# Patient Record
Sex: FEMALE | Race: WHITE | NOT HISPANIC OR LATINO | Employment: FULL TIME | URBAN - METROPOLITAN AREA
[De-identification: names, ages, dates, MRNs, and addresses within clinical notes are randomized per-mention and may not be internally consistent; named-entity substitution may affect disease eponyms.]

---

## 2017-08-30 ENCOUNTER — ALLSCRIPTS OFFICE VISIT (OUTPATIENT)
Dept: OTHER | Facility: OTHER | Age: 57
End: 2017-08-30

## 2017-08-30 DIAGNOSIS — Z12.39 ENCOUNTER FOR OTHER SCREENING FOR MALIGNANT NEOPLASM OF BREAST: ICD-10-CM

## 2017-10-25 ENCOUNTER — GENERIC CONVERSION - ENCOUNTER (OUTPATIENT)
Dept: OTHER | Facility: OTHER | Age: 57
End: 2017-10-25

## 2017-10-25 DIAGNOSIS — E78.9 DISORDER OF LIPOPROTEIN METABOLISM: ICD-10-CM

## 2017-10-25 DIAGNOSIS — R53.83 OTHER FATIGUE: ICD-10-CM

## 2017-12-01 ENCOUNTER — APPOINTMENT (OUTPATIENT)
Dept: LAB | Facility: CLINIC | Age: 57
End: 2017-12-01
Payer: COMMERCIAL

## 2017-12-01 DIAGNOSIS — E78.9 DISORDER OF LIPOPROTEIN METABOLISM: ICD-10-CM

## 2017-12-01 DIAGNOSIS — R53.83 OTHER FATIGUE: ICD-10-CM

## 2017-12-01 LAB
ALBUMIN SERPL BCP-MCNC: 3.4 G/DL (ref 3.5–5)
ALP SERPL-CCNC: 56 U/L (ref 46–116)
ALT SERPL W P-5'-P-CCNC: 37 U/L (ref 12–78)
ANION GAP SERPL CALCULATED.3IONS-SCNC: 6 MMOL/L (ref 4–13)
AST SERPL W P-5'-P-CCNC: 28 U/L (ref 5–45)
BILIRUB SERPL-MCNC: 0.46 MG/DL (ref 0.2–1)
BUN SERPL-MCNC: 22 MG/DL (ref 5–25)
CALCIUM SERPL-MCNC: 8.8 MG/DL (ref 8.3–10.1)
CHLORIDE SERPL-SCNC: 105 MMOL/L (ref 100–108)
CHOLEST SERPL-MCNC: 193 MG/DL (ref 50–200)
CO2 SERPL-SCNC: 29 MMOL/L (ref 21–32)
CREAT SERPL-MCNC: 0.99 MG/DL (ref 0.6–1.3)
GFR SERPL CREATININE-BSD FRML MDRD: 63 ML/MIN/1.73SQ M
GLUCOSE P FAST SERPL-MCNC: 84 MG/DL (ref 65–99)
HDLC SERPL-MCNC: 84 MG/DL (ref 40–60)
LDLC SERPL CALC-MCNC: 100 MG/DL (ref 0–100)
POTASSIUM SERPL-SCNC: 4.3 MMOL/L (ref 3.5–5.3)
PROT SERPL-MCNC: 7 G/DL (ref 6.4–8.2)
SODIUM SERPL-SCNC: 140 MMOL/L (ref 136–145)
TRIGL SERPL-MCNC: 45 MG/DL

## 2017-12-01 PROCEDURE — 80061 LIPID PANEL: CPT

## 2017-12-01 PROCEDURE — 80053 COMPREHEN METABOLIC PANEL: CPT

## 2017-12-01 PROCEDURE — 36415 COLL VENOUS BLD VENIPUNCTURE: CPT

## 2018-01-13 NOTE — PROGRESS NOTES
Assessment    1  Encounter for preventive health examination (V70 0) (Z00 00)    Plan  Health Maintenance    · (1) OCCULT BLOOD, FECAL IMMUNOCHEMICAL TEST; Status:Active; Requested  for:11Oct2016;     Discussion/Summary  health maintenance visit cervical cancer screening is current Breast cancer screening: mammogram is current  Colorectal cancer screening: FIT test ordered  Osteoporosis screening: bone mineral density testing is current  The immunizations are up to date  The patient's work physical was signed I'll see her back in one urine rashes problem sooner  Possible side effects of new medications were reviewed with the patient/guardian today  Chief Complaint  INSURANCE WELLNESS PE      History of Present Illness  HM, Adult Female: The patient is being seen for a health maintenance and The patient had her last physical one year ago evaluation  General Health: The patient's health since the last visit is described as good  She has regular dental visits  She denies vision problems  She denies hearing loss  Immunizations status: up to date  Lifestyle:  She consumes a diverse and healthy diet  She does not have any weight concerns  She exercises regularly  She does not use tobacco  She denies alcohol use  She denies drug use  Reproductive health:  she reports normal menses  Screening: cancer screening reviewed and current  metabolic screening reviewed and current  risk screening reviewed and current  HPI: The patient presents for an wellness physical  She is up-to-date with her GYN care, mammogram, blood work, and DEXA scan  She is in need of either colonoscopy or a stool screening and she's opted for the latter  Had flu shot yesterday at work  Review of Systems    Constitutional: No fever, no chills, feels well, no tiredness, no recent weight gain or weight loss  Eyes: No complaints of eye pain, no red eyes, no eyesight problems, no discharge, no dry eyes, no itching of eyes  ENT: no complaints of earache, no loss of hearing, no nose bleeds, no nasal discharge, no sore throat, no hoarseness  Cardiovascular: No complaints of slow heart rate, no fast heart rate, no chest pain, no palpitations, no leg claudication, no lower extremity edema  Respiratory: No complaints of shortness of breath, no wheezing, no cough, no SOB on exertion, no orthopnea, no PND  Gastrointestinal: No complaints of abdominal pain, no constipation, no nausea or vomiting, no diarrhea, no bloody stools  Genitourinary: No complaints of dysuria, no incontinence, no pelvic pain, no dysmenorrhea, no vaginal discharge or bleeding  Musculoskeletal: No complaints of arthralgias, no myalgias, no joint swelling or stiffness, no limb pain or swelling  Integumentary: No complaints of skin rash or lesions, no itching, no skin wounds, no breast pain or lump  Neurological: No complaints of headache, no confusion, no convulsions, no numbness, no dizziness or fainting, no tingling, no limb weakness, no difficulty walking  Psychiatric: Not suicidal, no sleep disturbance, no anxiety or depression, no change in personality, no emotional problems  Endocrine: No complaints of proptosis, no hot flashes, no muscle weakness, no deepening of the voice, no feelings of weakness  Hematologic/Lymphatic: No complaints of swollen glands, no swollen glands in the neck, does not bleed easily, does not bruise easily  Active Problems    1  Acute maxillary sinusitis (461 0) (J01 00)   2  Acute upper respiratory infection (465 9) (J06 9)   3  Anxiety (300 00) (F41 9)   4  Encounter for gynecological examination with Papanicolaou smear of cervix   (V72 31,V76 2) (Z01 419,Z12 4)   5  Encounter for screening colonoscopy (V76 51) (Z12 11)   6  Preoperative clearance (V72 84) (Z01 818)   7   Screening for breast cancer (V76 10) (Z12 39)    Past Medical History    · History of dysmenorrhea (V13 29) (Z87 42)   · History of female infertility (V13 29) (Z87 42)   · History of Menopause (627 2) (Z78 0)   · History of Pruritus (698 9) (L29 9)    Surgical History    · History of Biopsy Endometrial, Without Cervical Dilation   · History of Breast Surgery   · History of Cataract Surgery   · History of Laparoscopy (Diagnostic)   · History of Oral Surgery Tooth Extraction   · History of Tonsillectomy    Family History  Mother    · Family history of Diabetes Mellitus (V18 0)   · Family history of cerebrovascular accident (CVA) (V17 1) (Z82 3)   · Family history of osteoporosis (V17 81) (Z82 62)   · Family history of Hypertension (V17 49)  Father    · Family history of Hypertension (V17 49)  Maternal Grandfather    · Family history of diabetes mellitus (V18 0) (Z83 3)  Paternal Grandfather    · Family history of myocardial infarction (V17 3) (Z82 49)  Paternal Aunt    · Family history of malignant neoplasm of breast (V16 3) (Z80 3)    Social History    · Acute alcohol use   · Daily caffeine consumption, 2-3 servings a day   · Denied: History of Drug use   · Exercises 1 to 2 times per week (V49 89) (Z78 9)   · Full-time employment   · Marital History - Currently    ·    · Never A Smoker   · One child    Current Meds   1  ALPRAZolam 0 25 MG Oral Tablet; TAKE 1 TABLET AT BEDTIME; Therapy: 99YVV5051 to (Evaluate:94Qpg1285)  Requested for: 85Duz0938; Last   Rx:27Vcg2226 Ordered   2  Calcium 1200 CHEW; Chew and swallow two tab daily Recorded   3  FLUoxetine HCl - 20 MG Oral Capsule; TAKE 1 CAPSULE DAILY; Therapy: 12ZJK2290 to (Evaluate:76Fxe8743)  Requested for: 07LFS9603; Last   Rx:43Xmm3084 Ordered   4  Multivitamin TABS; Take 1 tablet daily Recorded    Allergies    1  Penicillins   2  Zithromax TABS   3   Zithromax PACK    Vitals   Recorded: 38NOA9244 12:57AY   Systolic 410   Diastolic 72   Heart Rate 56   Temperature 96 3 F   Height 5 ft 5 in   Weight 126 lb    BMI Calculated 20 97   BSA Calculated 1 63     Physical Exam    Constitutional   General appearance: No acute distress, well appearing and well nourished  Head and Face   Head and face: Normal     Palpation of the face and sinuses: No sinus tenderness  Eyes   Conjunctiva and lids: No swelling, erythema or discharge  Pupils and irises: Equal, round, reactive to light  Ophthalmoscopic examination: Normal fundi and optic discs  Ears, Nose, Mouth, and Throat   External inspection of ears and nose: Normal     Otoscopic examination: Tympanic membranes translucent with normal light reflex  Canals patent without erythema  Hearing: Normal     Nasal mucosa, septum, and turbinates: Normal without edema or erythema  Lips, teeth, and gums: Normal, good dentition  Oropharynx: Normal with no erythema, edema, exudate or lesions  Neck   Neck: Supple, symmetric, trachea midline, no masses  Thyroid: Normal, no thyromegaly  Pulmonary   Respiratory effort: No increased work of breathing or signs of respiratory distress  Auscultation of lungs: Clear to auscultation  Cardiovascular   Auscultation of heart: Normal rate and rhythm, normal S1 and S2, no murmurs  Carotid pulses: 2+ bilaterally  Pedal pulses: 2+ bilaterally  Peripheral vascular exam: Normal     Examination of extremities for edema and/or varicosities: Normal     Abdomen   Abdomen: Non-tender, no masses  Liver and spleen: No hepatomegaly or splenomegaly  Lymphatic   Palpation of lymph nodes in neck: No lymphadenopathy  Musculoskeletal   Digits and nails: Normal without clubbing or cyanosis  Skin   Skin and subcutaneous tissue: Normal without rashes or lesions  Palpation of skin and subcutaneous tissue: Normal turgor  Neurologic   Reflexes: 2+ and symmetric  Psychiatric   Judgment and insight: Normal     Orientation to person, place, and time: Normal     Recent and remote memory: Intact      Mood and affect: Normal        Results/Data  PHQ-2 Adult Depression Screening [id number] 03:39PM User, [facility]     Test Name Result Flag Reference   PHQ-2 Adult Depression Score 0     Over the last two weeks, how often have you been bothered by any of the following problems? Little interest or pleasure in doing things: Not at all - 0  Feeling down, depressed, or hopeless: Not at all - 0   PHQ-2 Adult Depression Screening Negative         Health Management  Encounter for screening colonoscopy   COLONOSCOPY; every 10 years; Next Due: 92PXD5004;  Overdue    Signatures   Electronically signed by : Alexander Kiran DO; Oct 15 2016 10:59AM EST                       (Author)

## 2018-01-13 NOTE — RESULT NOTES
Verified Results  (1) THIN PREP PAP WITH IMAGING 13Ctl0986 04:23PM Ila Anderson Order Number: [id number]     Test Name Result Flag Reference   LAB AP CASE REPORT (Report)     Gynecologic Cytology Report            Case: [id number]                  Authorizing Provider: Luna March MD     Collected:      08/23/2016 1623        First Screen:     BLESSING Garcia Received:      08/25/2016 1245        Specimen:  LIQUID-BASED PAP, SCREENING, Cervix   HPV HIGH RISK RESULT (Report)     HPV, High Risk: HPV NEG, HPV16 NEG, HPV18 NEG      Other High Risk HPV Negative, HPV 16 Negative, HPV 18 Negative  HPV types: 16,18,31,33,35,39,45,51,52,56,58,59,66 and 68 DNA are undetectable or below the pre-set threshold  [employer]?s FDA approved Heather 4800 is utilized with strict adherence to the ?s instruction  manual to test for the presence of High-Risk HPV DNA, as well as HPV 16 and HPV 18  This instrument  has been validated by our laboratory and/or by the   A negative result does not preclude the presence of HPV infection because results depend on adequate  specimen collection, absence of inhibitors and sufficient DNA to be detected  Additionally, HPV negative  results are not intended to prevent women from proceeding to colposcopy if clinically warranted  Positive HPV test results indicate the presence of any one or more of the high risk types, but since patients  are often co-infected with low-risk types it does not rule out the presence of low-risk types in patients  with mixed infections  LAB AP GYN PRIMARY INTERPRETATION      Negative for intraepithelial lesion or malignancy  Electronically signed by BLESSING Garcia on 9/1/2016 at 4:45 PM   LAB AP GYN SPECIMEN ADEQUACY      Satisfactory for evaluation  Endocervical/transformation zone component present     LAB AP GYN ADDITIONAL INFORMATION (Report)     [employer]'s FDA approved ,  and ThinPrep Imaging System are   utilized with strict adherence to the 's instruction manual to   prepare gynecologic and non-gynecologic cytology specimens for the   production of ThinPrep slides as well as for gynecologic ThinPrep imaging  These processes have been validated by our laboratory and/or by the     The Pap test is not a diagnostic procedure and should not be used as the   sole means to detect cervical cancer  It is only a screening procedure to   aid in the detection of cervical cancer and its precursors  Both   false-negative and false-positive results have been experienced  Your   patient's test result should be interpreted in this context together with   the history and clinical findings

## 2018-01-14 VITALS
DIASTOLIC BLOOD PRESSURE: 68 MMHG | WEIGHT: 121 LBS | SYSTOLIC BLOOD PRESSURE: 110 MMHG | BODY MASS INDEX: 20.16 KG/M2 | HEIGHT: 65 IN

## 2018-01-22 VITALS
WEIGHT: 121 LBS | TEMPERATURE: 97.1 F | BODY MASS INDEX: 20.16 KG/M2 | HEART RATE: 60 BPM | HEIGHT: 65 IN | DIASTOLIC BLOOD PRESSURE: 72 MMHG | SYSTOLIC BLOOD PRESSURE: 108 MMHG

## 2018-03-06 DIAGNOSIS — F41.9 ANXIETY: ICD-10-CM

## 2018-03-06 DIAGNOSIS — F41.9 ANXIETY: Primary | ICD-10-CM

## 2018-03-06 RX ORDER — ALPRAZOLAM 0.25 MG/1
0.25 TABLET ORAL
Qty: 90 TABLET | Refills: 0 | OUTPATIENT
Start: 2018-03-06 | End: 2018-06-06 | Stop reason: SDUPTHER

## 2018-03-06 RX ORDER — ALPRAZOLAM 0.25 MG/1
0.25 TABLET ORAL
Qty: 90 TABLET | Refills: 0 | OUTPATIENT
Start: 2018-03-06 | End: 2018-03-06 | Stop reason: SDUPTHER

## 2018-03-06 RX ORDER — ALPRAZOLAM 0.25 MG/1
1 TABLET ORAL DAILY
COMMUNITY
Start: 2011-06-21 | End: 2018-03-06 | Stop reason: SDUPTHER

## 2018-03-30 ENCOUNTER — TELEPHONE (OUTPATIENT)
Dept: FAMILY MEDICINE CLINIC | Facility: CLINIC | Age: 58
End: 2018-03-30

## 2018-03-30 DIAGNOSIS — J01.80 ACUTE NON-RECURRENT SINUSITIS OF OTHER SINUS: Primary | ICD-10-CM

## 2018-03-30 RX ORDER — AZITHROMYCIN 250 MG/1
TABLET, FILM COATED ORAL
Qty: 6 TABLET | Refills: 0 | Status: SHIPPED | OUTPATIENT
Start: 2018-03-30 | End: 2018-04-03

## 2018-04-26 LAB — HBA1C MFR BLD HPLC: 5.8 %

## 2018-05-08 ENCOUNTER — OFFICE VISIT (OUTPATIENT)
Dept: FAMILY MEDICINE CLINIC | Facility: CLINIC | Age: 58
End: 2018-05-08
Payer: COMMERCIAL

## 2018-05-08 VITALS
WEIGHT: 123.2 LBS | TEMPERATURE: 97.8 F | DIASTOLIC BLOOD PRESSURE: 70 MMHG | SYSTOLIC BLOOD PRESSURE: 102 MMHG | HEIGHT: 65 IN | BODY MASS INDEX: 20.53 KG/M2 | HEART RATE: 60 BPM

## 2018-05-08 DIAGNOSIS — M25.511 CHRONIC RIGHT SHOULDER PAIN: Primary | ICD-10-CM

## 2018-05-08 DIAGNOSIS — G89.29 CHRONIC RIGHT SHOULDER PAIN: Primary | ICD-10-CM

## 2018-05-08 DIAGNOSIS — D72.819 LEUKOPENIA, UNSPECIFIED TYPE: ICD-10-CM

## 2018-05-08 DIAGNOSIS — E13.9 DIABETES 1.5, MANAGED AS TYPE 2 (HCC): ICD-10-CM

## 2018-05-08 PROCEDURE — 99214 OFFICE O/P EST MOD 30 MIN: CPT | Performed by: FAMILY MEDICINE

## 2018-05-08 RX ORDER — FLUOXETINE HYDROCHLORIDE 20 MG/1
1 CAPSULE ORAL DAILY
COMMUNITY
Start: 2011-02-14 | End: 2018-07-12

## 2018-05-08 RX ORDER — PREDNISONE 10 MG/1
TABLET ORAL
Qty: 26 TABLET | Refills: 0 | Status: SHIPPED | OUTPATIENT
Start: 2018-05-08 | End: 2018-07-12

## 2018-05-08 NOTE — PROGRESS NOTES
Patient ID: Mani Sanabria is a 59 y o  female  HPI: 59 y  o female presenting with chief complaint of continued right shoulder pain, pain with rom and lifting  She denies any weakenss of shoulder  She took a medrol jessica a few mos ago which helped, but totally did not eradicate the pain  Labs drawn for her employer showed HgbA1c of 5 8 and decreased wbc of 2 9  She follows a low concentrated, low carb diet already  SUBJECTIVE    Family History   Problem Relation Age of Onset    Diabetes Mother      Mellitus    Stroke Mother      CVA    Osteoporosis Mother     Hypertension Mother     Hypertension Father     Diabetes Maternal Grandfather      Mellitus    Heart attack Paternal Grandfather      Myocardial Infarction    Breast cancer Paternal Aunt      Social History     Social History    Marital status: /Civil Union     Spouse name: N/A    Number of children: 1    Years of education: N/A     Occupational History    Full-time employment      Social History Main Topics    Smoking status: Never Smoker    Smokeless tobacco: Not on file    Alcohol use Yes      Comment: Acute    Drug use: No    Sexual activity: Not on file     Other Topics Concern    Not on file     Social History Narrative    Daily caffeine consumption: 2-3 servings a day    Exercises 1 to 2 times per week         No past medical history on file  Past Surgical History:   Procedure Laterality Date    BREAST SURGERY Left     Removal of fibroid tumor   Resolved: 1980    CATARACT EXTRACTION      Last assessed: 10/11/16    DIAGNOSTIC LAPAROSCOPY  1982    ENDOMETRIAL BIOPSY  1982    w/o cervical dilation    TONSILLECTOMY      WISDOM TOOTH EXTRACTION      Resolved: 1977     Allergies   Allergen Reactions    Azithromycin Hives     Reaction Date: 03Aug2011;     Penicillins Rash       Current Outpatient Prescriptions:     ALPRAZolam (XANAX) 0 25 mg tablet, Take 1 tablet (0 25 mg total) by mouth daily at bedtime as needed for anxiety, Disp: 90 tablet, Rfl: 0    FLUoxetine (PROzac) 20 mg capsule, Take 1 capsule by mouth daily, Disp: , Rfl:     Multiple Vitamins-Minerals (MULTIVITAMIN ADULTS) TABS, Take 1 tablet by mouth daily, Disp: , Rfl:     predniSONE 10 mg tablet, 3 tabs po bid x2 days, then 2 tabs po bid x2 days, then 1 tab bid x2 days, then 1 daily until done , Disp: 26 tablet, Rfl: 0    Review of Systems  Constitutional:     Denies fever, chills ,fatigue ,weakness ,weight loss, weight gain     ENT: Denies earache ,loss of hearing ,nosebleed, nasal discharge,nasal congestion ,sore throat ,hoarseness  Pulmonary: Denies shortness of breath ,cough  ,dyspnea on exertion, orthopnea  ,PND   Cardiovascular:  Denies bradycardia , tachycardia  ,palpations, lower extremity edema leg, claudication  Breast:  Denies new or changing breast lumps ,nipple discharge ,nipple changes  Abdomen:  Denies abdominal pain , anorexia , indigestion, nausea, vomiting, constipation, diarrhea  Musculoskeletal: Denies myalgias, + right shoulder pain with lifting and rom joint swelling, joint stiffness , limb pain, limb swelling  Gu: denies dysuria, polyuria  Skin: Denies skin rash, skin lesion, skin wound, itching, dry skin  Neuro: Denies headache, numbness, tingling, confusion, loss of consciousness, dizziness, vertigo  Psychiatric: Denies feelings of depression, suicidal ideation, anxiety, sleep disturbances    OBJECTIVE    Constitutional:   NAD, well appearing and well nourished      ENT:   Conjunctiva and lids: no injection, edema, or discharge     Pupils and iris: HUMBERTO bilaterally    External inspection of ears and nose: normal without deformities or discharge  Otoscopic exam: Canals patent without erythema  Nasal mucosa, septum and turbinates: Normal or edema or discharge         Oropharynx:  Moist mucosa, normal tongue and tonsils without lesions   No erythema        Pulmonary:Respiratory effort normal rate and rhythm, no increased work of breathing  Auscultation of lungs:  Clear bilaterally with no adventitious breath sounds       Cardiovascular: regular rate and rhythm, S1 and S2, no murmur, no edema and/or varicosities of LE      Abdomen: Soft and non-distended     Positive bowel sounds      No heptomegaly or splenomegaly      Gu: no suprapubic tenderness or CVA tenderness, no urethral discharge  Lymphatic:  No anterior or posterior cervical lymphadenopathy         Musculoskeletal:  Gait and station: Normal gait      Digits and nails normal without clubbing or cyanosis       Inspection/palpation of joints, bones, and muscles: + tenderenss overlying tendon of triceps on palpation; full rom of right shouler with pain    Skin: Normal skin turgor and no rashes      Neuro:     Normal reflexes       Psych:   alert and oriented to person, place and time     normal mood and affect       Assessment/Plan:Diagnoses and all orders for this visit:    Diabetes 1 5, managed as type 2 ([facility] 75 )  -     HEMOGLOBIN A1C W/ EAG ESTIMATION; Future    Leukopenia, unspecified type  -     CBC and differential; Future    Chronic right shoulder pain  -     XR shoulder 2+ vw right; Future  -     predniSONE 10 mg tablet; 3 tabs po bid x2 days, then 2 tabs po bid x2 days, then 1 tab bid x2 days, then 1 daily until done  Other orders  -     FLUoxetine (PROzac) 20 mg capsule; Take 1 capsule by mouth daily  -     Multiple Vitamins-Minerals (MULTIVITAMIN ADULTS) TABS; Take 1 tablet by mouth daily        Reviewed with patient plan to treat with the above  Will await xray results   She is to increase her physical activity and will recheck hgba1c in 3 mos  She is to wait 2 weeks after finishing steroid before rechecking her wBC      Patient instructed to call in 72 hours if not feeling better or if symptoms worsen

## 2018-05-11 ENCOUNTER — APPOINTMENT (OUTPATIENT)
Dept: RADIOLOGY | Facility: MEDICAL CENTER | Age: 58
End: 2018-05-11
Payer: COMMERCIAL

## 2018-05-11 DIAGNOSIS — M25.511 CHRONIC RIGHT SHOULDER PAIN: ICD-10-CM

## 2018-05-11 DIAGNOSIS — G89.29 CHRONIC RIGHT SHOULDER PAIN: ICD-10-CM

## 2018-05-11 PROCEDURE — 73030 X-RAY EXAM OF SHOULDER: CPT

## 2018-05-18 DIAGNOSIS — M25.511 RIGHT SHOULDER PAIN, UNSPECIFIED CHRONICITY: Primary | ICD-10-CM

## 2018-06-06 DIAGNOSIS — F41.9 ANXIETY: ICD-10-CM

## 2018-06-06 RX ORDER — FLUOXETINE HYDROCHLORIDE 20 MG/1
20 CAPSULE ORAL DAILY
Qty: 90 CAPSULE | Refills: 3 | Status: SHIPPED | OUTPATIENT
Start: 2018-06-06 | End: 2019-09-01 | Stop reason: SDUPTHER

## 2018-06-06 RX ORDER — ALPRAZOLAM 0.25 MG/1
0.25 TABLET ORAL
Qty: 90 TABLET | Refills: 0 | Status: SHIPPED | OUTPATIENT
Start: 2018-06-06 | End: 2018-09-05 | Stop reason: SDUPTHER

## 2018-06-18 ENCOUNTER — HOSPITAL ENCOUNTER (OUTPATIENT)
Dept: MRI IMAGING | Facility: HOSPITAL | Age: 58
Discharge: HOME | End: 2018-06-18
Payer: COMMERCIAL

## 2018-06-18 DIAGNOSIS — M25.511 RIGHT SHOULDER PAIN, UNSPECIFIED CHRONICITY: ICD-10-CM

## 2018-06-18 PROCEDURE — 73221 MRI JOINT UPR EXTREM W/O DYE: CPT

## 2018-06-20 DIAGNOSIS — M77.8 TENDONITIS OF SHOULDER, RIGHT: Primary | ICD-10-CM

## 2018-06-21 ENCOUNTER — APPOINTMENT (OUTPATIENT)
Dept: LAB | Facility: CLINIC | Age: 58
End: 2018-06-21
Payer: COMMERCIAL

## 2018-06-21 DIAGNOSIS — D72.819 LEUKOPENIA, UNSPECIFIED TYPE: ICD-10-CM

## 2018-06-21 LAB
BASOPHILS # BLD AUTO: 0.02 THOUSANDS/ΜL (ref 0–0.1)
BASOPHILS NFR BLD AUTO: 1 % (ref 0–1)
EOSINOPHIL # BLD AUTO: 0.04 THOUSAND/ΜL (ref 0–0.61)
EOSINOPHIL NFR BLD AUTO: 1 % (ref 0–6)
ERYTHROCYTE [DISTWIDTH] IN BLOOD BY AUTOMATED COUNT: 14 % (ref 11.6–15.1)
HCT VFR BLD AUTO: 39.4 % (ref 34.8–46.1)
HGB BLD-MCNC: 12.6 G/DL (ref 11.5–15.4)
IMM GRANULOCYTES # BLD AUTO: 0.02 THOUSAND/UL (ref 0–0.2)
IMM GRANULOCYTES NFR BLD AUTO: 1 % (ref 0–2)
LYMPHOCYTES # BLD AUTO: 1.35 THOUSANDS/ΜL (ref 0.6–4.47)
LYMPHOCYTES NFR BLD AUTO: 31 % (ref 14–44)
MCH RBC QN AUTO: 32.1 PG (ref 26.8–34.3)
MCHC RBC AUTO-ENTMCNC: 32 G/DL (ref 31.4–37.4)
MCV RBC AUTO: 100 FL (ref 82–98)
MONOCYTES # BLD AUTO: 0.25 THOUSAND/ΜL (ref 0.17–1.22)
MONOCYTES NFR BLD AUTO: 6 % (ref 4–12)
NEUTROPHILS # BLD AUTO: 2.73 THOUSANDS/ΜL (ref 1.85–7.62)
NEUTS SEG NFR BLD AUTO: 60 % (ref 43–75)
NRBC BLD AUTO-RTO: 0 /100 WBCS
PLATELET # BLD AUTO: 251 THOUSANDS/UL (ref 149–390)
PMV BLD AUTO: 10.3 FL (ref 8.9–12.7)
RBC # BLD AUTO: 3.93 MILLION/UL (ref 3.81–5.12)
WBC # BLD AUTO: 4.41 THOUSAND/UL (ref 4.31–10.16)

## 2018-06-21 PROCEDURE — 36415 COLL VENOUS BLD VENIPUNCTURE: CPT

## 2018-06-21 PROCEDURE — 85025 COMPLETE CBC W/AUTO DIFF WBC: CPT

## 2018-07-12 ENCOUNTER — OFFICE VISIT (OUTPATIENT)
Dept: OBGYN CLINIC | Facility: MEDICAL CENTER | Age: 58
End: 2018-07-12
Payer: COMMERCIAL

## 2018-07-12 VITALS
SYSTOLIC BLOOD PRESSURE: 115 MMHG | BODY MASS INDEX: 19.83 KG/M2 | DIASTOLIC BLOOD PRESSURE: 72 MMHG | HEART RATE: 54 BPM | WEIGHT: 119 LBS | HEIGHT: 65 IN

## 2018-07-12 DIAGNOSIS — M67.813 BICEPS TENDINOSIS OF RIGHT SHOULDER: ICD-10-CM

## 2018-07-12 DIAGNOSIS — M25.511 RIGHT SHOULDER PAIN, UNSPECIFIED CHRONICITY: Primary | ICD-10-CM

## 2018-07-12 DIAGNOSIS — M77.8 TENDONITIS OF SHOULDER, RIGHT: ICD-10-CM

## 2018-07-12 DIAGNOSIS — M75.51 SUBACROMIAL BURSITIS OF RIGHT SHOULDER JOINT: ICD-10-CM

## 2018-07-12 PROCEDURE — 20610 DRAIN/INJ JOINT/BURSA W/O US: CPT | Performed by: FAMILY MEDICINE

## 2018-07-12 PROCEDURE — 99204 OFFICE O/P NEW MOD 45 MIN: CPT | Performed by: FAMILY MEDICINE

## 2018-07-12 RX ORDER — LIDOCAINE HYDROCHLORIDE 10 MG/ML
4 INJECTION, SOLUTION INFILTRATION; PERINEURAL
Status: COMPLETED | OUTPATIENT
Start: 2018-07-12 | End: 2018-07-12

## 2018-07-12 RX ORDER — TRIAMCINOLONE ACETONIDE 40 MG/ML
40 INJECTION, SUSPENSION INTRA-ARTICULAR; INTRAMUSCULAR
Status: COMPLETED | OUTPATIENT
Start: 2018-07-12 | End: 2018-07-12

## 2018-07-12 RX ADMIN — TRIAMCINOLONE ACETONIDE 40 MG: 40 INJECTION, SUSPENSION INTRA-ARTICULAR; INTRAMUSCULAR at 15:28

## 2018-07-12 RX ADMIN — LIDOCAINE HYDROCHLORIDE 4 ML: 10 INJECTION, SOLUTION INFILTRATION; PERINEURAL at 15:28

## 2018-07-12 NOTE — PROGRESS NOTES
Assessment:     1  Right shoulder pain, unspecified chronicity    2  Tendonitis of shoulder, right    3  Subacromial bursitis of right shoulder joint    4  Biceps tendinosis of right shoulder        Plan:     Problem List Items Addressed This Visit     Subacromial bursitis of right shoulder joint    Relevant Orders    Ambulatory referral to Physical Therapy    Tendonitis of shoulder, right     Patient with right shoulder pain consistent with tendinosis of the rotator cuff muscles and biceps tendon  MRI results have been reviewed with the patient  Today she has been provided with intra-articular cortisone injection which she tolerated well  Going forward, we will recommend physical therapy for additional shoulder strengthening and range of motion  Follow-up in the future as needed for any further concerns or questions  Relevant Orders    Ambulatory referral to Physical Therapy    Biceps tendinosis of right shoulder      Other Visit Diagnoses     Right shoulder pain, unspecified chronicity    -  Primary    Relevant Orders    Ambulatory referral to Physical Therapy         Subjective:     Patient ID: Javi Kaufman is a 59 y o  female  Chief Complaint:  Patient is a 75-year-old female presenting today for evaluation of right shoulder pain  She reports having pain for the past few months located over the anterior lateral aspect of the shoulder  Pain is described as a throbbing, achy pain that radiates down his right arm to the elbow  She also reports having difficulty with getting dressed and reaching behind her back or above her head with putting on her clothes  She does not report any injuries or trauma to the shoulder  She has completed 2 courses of prednisone therapy in addition to anti-inflammatories provided to her by her PCP which provided minimal relief  She denies any numbness or tingling  She denies any crepitus, warmth or swelling        Allergy:  Allergies   Allergen Reactions    Azithromycin Hives     Reaction Date: 03Aug2011;     Penicillins Rash     Medications:  all current active meds have been reviewed  Past Medical History:  Past Medical History:   Diagnosis Date    Fractures      Past Surgical History:  Past Surgical History:   Procedure Laterality Date    BREAST SURGERY Left     Removal of fibroid tumor  Resolved: 1980    CATARACT EXTRACTION      Last assessed: 10/11/16    DIAGNOSTIC LAPAROSCOPY  1982    ENDOMETRIAL BIOPSY  1982    w/o cervical dilation    TONSILLECTOMY      WISDOM TOOTH EXTRACTION      Resolved: 1977     Family History:  Family History   Problem Relation Age of Onset    Diabetes Mother         Mellitus    Stroke Mother         CVA    Osteoporosis Mother     Hypertension Mother     Hypertension Father     Diabetes Maternal Grandfather         Mellitus    Heart attack Paternal Grandfather         Myocardial Infarction    Breast cancer Paternal Aunt      Social History:  History   Alcohol Use    Yes     Comment: Acute     History   Drug Use No     History   Smoking Status    Never Smoker   Smokeless Tobacco    Never Used     Review of Systems   Constitutional: Negative  HENT: Negative  Eyes: Negative  Respiratory: Negative  Cardiovascular: Negative  Gastrointestinal: Negative  Genitourinary: Negative  Musculoskeletal: Positive for arthralgias and myalgias  Skin: Negative  Allergic/Immunologic: Negative  Neurological: Negative  Hematological: Negative  Psychiatric/Behavioral: Negative  Objective:  BP Readings from Last 1 Encounters:   07/12/18 115/72      Wt Readings from Last 1 Encounters:   07/12/18 54 kg (119 lb)      BMI:   Estimated body mass index is 19 8 kg/m² as calculated from the following:    Height as of this encounter: 5' 5" (1 651 m)  Weight as of this encounter: 54 kg (119 lb)    BSA:   Estimated body surface area is 1 59 meters squared as calculated from the following:    Height as of this encounter: 5' 5" (1 651 m)  Weight as of this encounter: 54 kg (119 lb)  Physical Exam   Constitutional: She is oriented to person, place, and time  Vital signs are normal  She appears well-developed  HENT:   Head: Normocephalic  Eyes: Pupils are equal, round, and reactive to light  Pulmonary/Chest: Effort normal    Musculoskeletal: She exhibits tenderness  Neurological: She is alert and oriented to person, place, and time  Skin: Skin is warm and dry  Psychiatric: She has a normal mood and affect  Nursing note and vitals reviewed  Right Shoulder Exam     Tenderness   The patient is experiencing tenderness in the acromioclavicular joint, biceps tendon and acromion  Range of Motion   Active Abduction: abnormal   Passive Abduction: abnormal   Extension: abnormal   Forward Flexion: abnormal   External Rotation: abnormal   Internal Rotation 0 degrees: abnormal   Internal Rotation 90 degrees: abnormal     Muscle Strength   Abduction: 4/5   Internal Rotation: 4/5   External Rotation: 4/5   Supraspinatus: 4/5   Subscapularis: 4/5   Biceps: 4/5     Tests   Apprehension: positive  Hawkin's test: positive  Impingement: positive    Other   Erythema: absent  Sensation: normal  Pulse: present      Left Shoulder Exam   Left shoulder exam is normal     Tenderness   The patient is experiencing no tenderness  Range of Motion   The patient has normal left shoulder ROM            Large joint arthrocentesis  Date/Time: 7/12/2018 3:28 PM  Consent given by: patient  Site marked: site marked  Supporting Documentation  Indications: pain and joint swelling   Procedure Details  Location: shoulder - R glenohumeral  Preparation: Patient was prepped and draped in the usual sterile fashion  Needle size: 22 G  Ultrasound guidance: no  Approach: posterior  Medications administered: 4 mL lidocaine 1 %; 40 mg triamcinolone acetonide 40 mg/mL    Patient tolerance: patient tolerated the procedure well with no immediate complications  Dressing:  Sterile dressing applied      I have personally reviewed pertinent films in PACS  MRI findings demonstrate Supraspinatus infraspinatus insertional tendinosis with no evidence of full-thickness tear of the rotator cuff  There is moderate biceps tendinosis with no tear  Small subacromial bursitis  Mild AC joint degenerative changes

## 2018-07-12 NOTE — ASSESSMENT & PLAN NOTE
Patient with right shoulder pain consistent with tendinosis of the rotator cuff muscles and biceps tendon  MRI results have been reviewed with the patient  Today she has been provided with intra-articular cortisone injection which she tolerated well  Going forward, we will recommend physical therapy for additional shoulder strengthening and range of motion  Follow-up in the future as needed for any further concerns or questions

## 2018-07-31 ENCOUNTER — EVALUATION (OUTPATIENT)
Dept: PHYSICAL THERAPY | Facility: CLINIC | Age: 58
End: 2018-07-31
Payer: COMMERCIAL

## 2018-07-31 DIAGNOSIS — M77.8 TENDONITIS OF SHOULDER, RIGHT: ICD-10-CM

## 2018-07-31 DIAGNOSIS — M25.511 RIGHT SHOULDER PAIN, UNSPECIFIED CHRONICITY: Primary | ICD-10-CM

## 2018-07-31 DIAGNOSIS — M67.813 BICEPS TENDINOSIS OF RIGHT SHOULDER: ICD-10-CM

## 2018-07-31 DIAGNOSIS — M75.51 SUBACROMIAL BURSITIS OF RIGHT SHOULDER JOINT: ICD-10-CM

## 2018-07-31 PROCEDURE — 97110 THERAPEUTIC EXERCISES: CPT | Performed by: PHYSICAL THERAPIST

## 2018-07-31 PROCEDURE — G8991 OTHER PT/OT GOAL STATUS: HCPCS | Performed by: PHYSICAL THERAPIST

## 2018-07-31 PROCEDURE — 97161 PT EVAL LOW COMPLEX 20 MIN: CPT | Performed by: PHYSICAL THERAPIST

## 2018-07-31 PROCEDURE — G8990 OTHER PT/OT CURRENT STATUS: HCPCS | Performed by: PHYSICAL THERAPIST

## 2018-07-31 NOTE — PROGRESS NOTES
PT Evaluation     Today's date: 2018  Patient name: Andreia Light  : 1960  MRN: [id number]  Referring provider: Aurelio Hinson DO  Dx:   Encounter Diagnosis     ICD-10-CM    1  Right shoulder pain, unspecified chronicity M25 511 Ambulatory referral to Physical Therapy   2  Tendonitis of shoulder, right M75 81 Ambulatory referral to Physical Therapy   3  Subacromial bursitis of right shoulder joint M75 51 Ambulatory referral to Physical Therapy                  Assessment  Impairments: abnormal or restricted ROM, impaired physical strength, lacks appropriate home exercise program and pain with function    Assessment details: Patient presents with pain and decreased function in the right shoulder with tendinosis of the rotator cuff and proximal biceps as well as subacromial bursitis  Understanding of Dx/Px/POC: good   Prognosis: good    Goals  STG Patient is independent with HEP   STG Increase strength by half grade in 2 weeks  STG Range of motion is improved by 25% in 4 weeks  LTG Flexibility is improved in 4 weeks  LTG ADL performance improved to prior level of function      Plan  Patient would benefit from: skilled physical therapy  Planned therapy interventions: manual therapy, joint mobilization, strengthening, therapeutic activities, therapeutic exercise, stretching, graded activity, home exercise program and functional ROM exercises  Frequency: 2x week  Duration in visits: 12  Duration in weeks: 6  Treatment plan discussed with: patient        Subjective Evaluation    History of Present Illness  Mechanism of injury: Patient reports onset right shoulder pain starting in the fall of   Pain increased and decreased over time and eventually she had an MRI which revealed supraspinatus/infraspinatus and proximal biceps tendinosis with SA bursitis  She had an injection on  which reduced symptoms after a week      Pain  Current pain ratin  At worst pain ratin  Quality: dull ache  Relieving factors: rest    Patient Goals  Patient goals for therapy: increased strength, independence with ADLs/IADLs, decreased pain and increased motion          Objective     Active Range of Motion   Left Shoulder   Flexion: 170 degrees   Extension: 75 degrees   Abduction: 175 degrees     Right Shoulder   Flexion: 130 degrees   Extension: 40 degrees   Abduction: 135 degrees     Additional Active Range of Motion Details  Reach up low back (C7 to thumb)  7cm left, 22cm right      Passive Range of Motion     Right Shoulder   Flexion: 135 degrees   Abduction: 105 degrees   External rotation 90°: 90 degrees   Internal rotation 45°: 35 degrees     Strength/Myotome Testing     Right Shoulder     Planes of Motion   External rotation at 0°: 4   Internal rotation at 0°: 4     General Comments     Shoulder Comments   Posture:  Left lateral trunk shift          Precautions: none     Daily Treatment Diary     Manual  7/31            IR stretching 5'            Inferior glides                                                        Exercise Diary  7/31            Sleeper stretch IR :20x3            Table slides flexion :05x10            pendulums             No pulley's yet             pball rolls on table             RTC isometrics             TB rows y 21            TB ext y20            TB ER y20            TB IR             TB w's when able             [facility] ext                                                                                                                         Modalities

## 2018-08-02 ENCOUNTER — OFFICE VISIT (OUTPATIENT)
Dept: PHYSICAL THERAPY | Facility: CLINIC | Age: 58
End: 2018-08-02
Payer: COMMERCIAL

## 2018-08-02 DIAGNOSIS — M25.511 RIGHT SHOULDER PAIN, UNSPECIFIED CHRONICITY: Primary | ICD-10-CM

## 2018-08-02 DIAGNOSIS — M77.8 TENDONITIS OF SHOULDER, RIGHT: ICD-10-CM

## 2018-08-02 DIAGNOSIS — M75.51 SUBACROMIAL BURSITIS OF RIGHT SHOULDER JOINT: ICD-10-CM

## 2018-08-02 DIAGNOSIS — M67.813 BICEPS TENDINOSIS OF RIGHT SHOULDER: ICD-10-CM

## 2018-08-02 PROCEDURE — 97110 THERAPEUTIC EXERCISES: CPT | Performed by: PHYSICAL THERAPIST

## 2018-08-02 PROCEDURE — G8991 OTHER PT/OT GOAL STATUS: HCPCS | Performed by: PHYSICAL THERAPIST

## 2018-08-02 PROCEDURE — 97140 MANUAL THERAPY 1/> REGIONS: CPT | Performed by: PHYSICAL THERAPIST

## 2018-08-02 PROCEDURE — G8990 OTHER PT/OT CURRENT STATUS: HCPCS | Performed by: PHYSICAL THERAPIST

## 2018-08-02 NOTE — PROGRESS NOTES
Daily Note     Today's date: 2018  Patient name: Corbin Sierra  : 1960  MRN: [id number]  Referring provider: Asaf Angel DO  Dx:   Encounter Diagnosis     ICD-10-CM    1  Right shoulder pain, unspecified chronicity M25 511    2  Tendonitis of shoulder, right M75 81    3  Subacromial bursitis of right shoulder joint M75 51    4  Biceps tendinosis of right shoulder M75 21                   Subjective: she has been consistent with home exercises; mild soreness after last treatment  No change overall      Objective: See treatment diary below  Precautions: none         Daily Treatment Diary      Manual                     IR stretching 5'  10'                   Inferior glides    p!                                                                                                 Exercise Diary                     Sleeper stretch IR :20x3  :20x3                   Table slides flexion :05x10                     pendulums                       No pulley's yet                       pball rolls on table                       RTC isometrics    :05x20 ER, ext                   TB rows y 20  red :10x10                   TB ext y20  red :10x10                   TB ER y20  red :10x10 partial                   TB IR    red :10x10                   TB w's when able                       Cane ext    #2 :10x10                                                                                                                                                                                                                         Modalities                                                                                                       Assessment: Tolerated treatment well  Patient exhibited good technique with therapeutic exercises and would benefit from continued PT  Mild fatigue after treatment with muscle soreness but no pain  Patient will continue with stretching at home    No strengthening exercises added to allow for adequate recovery time between treatments  Plan: Progress treatment as tolerated

## 2018-08-07 ENCOUNTER — OFFICE VISIT (OUTPATIENT)
Dept: PHYSICAL THERAPY | Facility: CLINIC | Age: 58
End: 2018-08-07
Payer: COMMERCIAL

## 2018-08-07 DIAGNOSIS — M67.813 BICEPS TENDINOSIS OF RIGHT SHOULDER: ICD-10-CM

## 2018-08-07 DIAGNOSIS — M25.511 RIGHT SHOULDER PAIN, UNSPECIFIED CHRONICITY: Primary | ICD-10-CM

## 2018-08-07 DIAGNOSIS — M77.8 TENDONITIS OF SHOULDER, RIGHT: ICD-10-CM

## 2018-08-07 DIAGNOSIS — M75.51 SUBACROMIAL BURSITIS OF RIGHT SHOULDER JOINT: ICD-10-CM

## 2018-08-07 PROCEDURE — 97140 MANUAL THERAPY 1/> REGIONS: CPT

## 2018-08-07 PROCEDURE — 97110 THERAPEUTIC EXERCISES: CPT

## 2018-08-07 NOTE — PROGRESS NOTES
Daily Note     Today's date: 2018  Patient name: Gloria Estrada  : 1960  MRN: [id number]  Referring provider: Quyen Mcqueen DO  Dx:   Encounter Diagnosis     ICD-10-CM    1  Right shoulder pain, unspecified chronicity M25 511    2  Tendonitis of shoulder, right M75 81    3  Subacromial bursitis of right shoulder joint M75 51    4  Biceps tendinosis of right shoulder M75 21                   Subjective: Patient reports soreness following last session which resolved in 24 hours, no change in shoulder pain overall        Objective: See treatment diary below  Precautions: none         Daily Treatment Diary      Manual                   IR stretching 5'  10'  10'                 Inferior glides    p!                                                                                                 Exercise Diary                   Sleeper stretch IR :20x3  :20x3  :20x4                 Table slides flexion :05x10                     pendulums      20                 No pulley's yet                       pball rolls on table     :05 10x                 RTC isometrics    :05x20 ER, ext :05x20 ER, ext, IR                 TB rows y 20  red :10x10  red :10x10                 TB ext y20  red :10x10  red :10x10                 TB ER y20  red :10x10 partial  red :10x10                 TB IR    red :10x10  red :10x10                 TB w's when able                       Cane ext    #2 :10x10  2# :10x10                                                                                                                                                                                                                       Modalities                                                                                                   Assessment: Tolerated treatment well  Patient had increased discomfort initially with IR stretching  Focused on avoiding pain with exercises performed this session  Most challenged by TB ER  Patient exhibited good technique with therapeutic exercises and would benefit from continued PT      Plan: Progress treatment as tolerated

## 2018-08-09 ENCOUNTER — OFFICE VISIT (OUTPATIENT)
Dept: PHYSICAL THERAPY | Facility: CLINIC | Age: 58
End: 2018-08-09
Payer: COMMERCIAL

## 2018-08-09 DIAGNOSIS — M25.511 RIGHT SHOULDER PAIN, UNSPECIFIED CHRONICITY: Primary | ICD-10-CM

## 2018-08-09 DIAGNOSIS — M77.8 TENDONITIS OF SHOULDER, RIGHT: ICD-10-CM

## 2018-08-09 DIAGNOSIS — M75.51 SUBACROMIAL BURSITIS OF RIGHT SHOULDER JOINT: ICD-10-CM

## 2018-08-09 DIAGNOSIS — M67.813 BICEPS TENDINOSIS OF RIGHT SHOULDER: ICD-10-CM

## 2018-08-09 PROCEDURE — 97140 MANUAL THERAPY 1/> REGIONS: CPT

## 2018-08-09 PROCEDURE — 97110 THERAPEUTIC EXERCISES: CPT

## 2018-08-09 NOTE — PROGRESS NOTES
Daily Note     Today's date: 2018  Patient name: Van Cockayne  : 1960  MRN: [id number]  Referring provider: Misael Cardoso DO  Dx:   Encounter Diagnosis     ICD-10-CM    1  Right shoulder pain, unspecified chronicity M25 511    2  Tendonitis of shoulder, right M75 81    3  Subacromial bursitis of right shoulder joint M75 51    4  Biceps tendinosis of right shoulder M75 21                   Subjective: Patient reports general muscular soreness following last session, mild decrease in shoulder pain over the last two days  Objective: Continued with TE progression as indicated    See treatment diary below  Precautions: none         Daily Treatment Diary      Manual                 IR stretching 5'  10'  10'  8'               Inferior glides    p!                                                                                                 Exercise Diary                 Sleeper stretch IR :20x3  :20x3  :20x4 :20x4               Table slides flexion :05x10                     pendulums      20                 No pulley's yet                       pball rolls on table     :05 10x :05 15x                RTC isometrics   :05x20 ER, ext :05 x20 ER, ext, IR :05 x20 ER, ext, IR               TB rows y 20  red :10x10  red :10x10  red :10 x15               TB ext y20  red :10x10  red :10x10  red :10 x15               TB ER y20  red :10x10 partial  red :10x10  red :10 x15               TB IR    red :10x10  red :10x10  red :10 x15               TB w's when able                       Cane ext    #2 :10x10  2# :10x10  2# :10 x10                                                                                                                                                                                                                     Modalities                                                                                                   Assessment: Tolerated treatment well  Better tolerance to IR stretching today  Patient notes fatigue with isometrics (performed last today) though denies pain throughout session  Patient demonstrated fatigue post treatment and would benefit from continued PT      Plan: Progress treatment as tolerated

## 2018-08-14 ENCOUNTER — OFFICE VISIT (OUTPATIENT)
Dept: PHYSICAL THERAPY | Facility: CLINIC | Age: 58
End: 2018-08-14
Payer: COMMERCIAL

## 2018-08-14 DIAGNOSIS — M67.813 BICEPS TENDINOSIS OF RIGHT SHOULDER: ICD-10-CM

## 2018-08-14 DIAGNOSIS — M25.511 RIGHT SHOULDER PAIN, UNSPECIFIED CHRONICITY: Primary | ICD-10-CM

## 2018-08-14 DIAGNOSIS — M75.51 SUBACROMIAL BURSITIS OF RIGHT SHOULDER JOINT: ICD-10-CM

## 2018-08-14 DIAGNOSIS — M77.8 TENDONITIS OF SHOULDER, RIGHT: ICD-10-CM

## 2018-08-14 PROCEDURE — 97140 MANUAL THERAPY 1/> REGIONS: CPT

## 2018-08-14 PROCEDURE — 97110 THERAPEUTIC EXERCISES: CPT

## 2018-08-14 PROCEDURE — 97112 NEUROMUSCULAR REEDUCATION: CPT

## 2018-08-14 NOTE — PROGRESS NOTES
Daily Note     Today's date: 2018  Patient name: Gunjan Avelar  : 1960  MRN: [id number]  Referring provider: Amando Quinones DO  Dx:   Encounter Diagnosis     ICD-10-CM    1  Right shoulder pain, unspecified chronicity M25 511    2  Tendonitis of shoulder, right M75 81    3  Subacromial bursitis of right shoulder joint M75 51    4  Biceps tendinosis of right shoulder M75 21                   Subjective: Patient reports her shoulder continues to feel better though she still has pain with overhead activities and quick reaching into abduction      Objective:  Initiated UBE retro and continued with exercise progression as indicated    See treatment diary below  Precautions: none         Daily Treatment Diary      Manual               IR stretching 5'  10'  10'  8'  8'             Inferior glides    p!                                                                                                 Exercise Diary               Sleeper stretch IR :20x3  :20x3  :20x4 :20x4 :20x4             Table slides flexion :05x10                     pendulums      20                 No pulley's yet                       pball rolls on table     :05 10x :05 15x   :05 20x             RTC isometrics   :05x20 ER, ext :05 x20 ER, ext, IR :05 x20 ER, ext, IR :05 20x ER, ext, IR, flex              SL ER      30x         prone row      30x        TB rows y 20  red :10x10  red :10x10  red :10 x15  red 3x10             TB ext y20  red :10x10  red :10x10  red :10 x15  red 3x10             TB ER bilat y20  red :10x10 partial  red :10x10  red :10 x15  red 3x10             TB IR    red :10x10  red :10x10  red :10 x15  red 3x10             TB w's when able         yellow 2x10             Cane ext    #2 :10x10  2# :10x10  2# :10 x10  2# :10 10x             UBE retro           5'                                                                                                                                                                                         Modalities                                                                                                    Assessment: Tolerated treatment well  Continued tightness into PROM IR with noted improvement post manual stretching  No increased pain with exercises initiated today though patient notes fatigue at conclusion of session  Notes IR sleeper stretch is most uncomfortable exercise  Patient exhibited good technique with therapeutic exercises and would benefit from continued PT      Plan: Progress treatment as tolerated

## 2018-08-16 ENCOUNTER — OFFICE VISIT (OUTPATIENT)
Dept: PHYSICAL THERAPY | Facility: CLINIC | Age: 58
End: 2018-08-16
Payer: COMMERCIAL

## 2018-08-16 DIAGNOSIS — M75.51 SUBACROMIAL BURSITIS OF RIGHT SHOULDER JOINT: ICD-10-CM

## 2018-08-16 DIAGNOSIS — M67.813 BICEPS TENDINOSIS OF RIGHT SHOULDER: ICD-10-CM

## 2018-08-16 DIAGNOSIS — M25.511 RIGHT SHOULDER PAIN, UNSPECIFIED CHRONICITY: Primary | ICD-10-CM

## 2018-08-16 DIAGNOSIS — M77.8 TENDONITIS OF SHOULDER, RIGHT: ICD-10-CM

## 2018-08-16 PROCEDURE — 97140 MANUAL THERAPY 1/> REGIONS: CPT

## 2018-08-16 PROCEDURE — 97110 THERAPEUTIC EXERCISES: CPT

## 2018-08-16 PROCEDURE — 97112 NEUROMUSCULAR REEDUCATION: CPT

## 2018-08-16 NOTE — PROGRESS NOTES
Daily Note     Today's date: 2018  Patient name: Javi Kaufman  : 1960  MRN: [id number]  Referring provider: Viviane Torres DO  Dx:   Encounter Diagnosis     ICD-10-CM    1  Right shoulder pain, unspecified chronicity M25 511    2  Tendonitis of shoulder, right M75 81    3  Subacromial bursitis of right shoulder joint M75 51    4  Biceps tendinosis of right shoulder M75 21                   Subjective: Patient reports fatigue following last session along with mild muscular soreness  She has not had significant pain over the last two days though she has not attempted overhead activities      Objective:  Continued with TE progression focusing on maintaining pain free ROM    See treatment diary below  Precautions: none         Daily Treatment Diary      Manual             IR stretching 5'  10'  10'  8'  8'  8'           Inferior glides    p!                                                                                                 Exercise Diary             Sleeper stretch IR :20x3  :20x3  :20x4 :20x4 :20x4 :20x4           Table slides flexion :05x10                     pendulums      20                 No pulley's yet                       pball rolls on table     :05 10x :05 15x   :05 20x  :05 20x           RTC isometrics   :05x20 ER, ext :05 x20 ER, ext, IR :05 x20 ER, ext, IR :05 20x ER, ext, IR, flex  :05 20x ER, ext, IR, flex            SL ER          30x  30x            prone row          30x  30x           TB rows y 20  red :10x10  red :10x10  red :10 x15  red 3x10  red 3x10           TB ext y20  red :10x10  red :10x10  red :10 x15  red 3x10  red 3x10           TB ER bilat y20  red :10x10 partial  red :10x10  red :10 x15  red 3x10  red 3x10           TB IR    red :10x10  red :10x10  red :10 x15  red 3x10  red 3x10           TB w's when able         yellow 2x10 yellow 3x10           Cane ext    #2 :10x10  2# :10x10  2# :10 x10  2# :10 10x  3# :10 10x           UBE retro           5'  5'                                                                                                                                                                                         Modalities                                                                                                     Assessment: Tolerated treatment well  Increased discomfort initially with PROM IR, subsided after ~5 repetitions  Patient denies increased shoulder pain with exercises performed today  Most challenged by ER strengthening exercises  Patient demonstrated fatigue post treatment and would benefit from continued PT      Plan: Progress treatment as tolerated

## 2018-08-21 ENCOUNTER — OFFICE VISIT (OUTPATIENT)
Dept: PHYSICAL THERAPY | Facility: CLINIC | Age: 58
End: 2018-08-21
Payer: COMMERCIAL

## 2018-08-21 DIAGNOSIS — M25.511 RIGHT SHOULDER PAIN, UNSPECIFIED CHRONICITY: Primary | ICD-10-CM

## 2018-08-21 DIAGNOSIS — M67.813 BICEPS TENDINOSIS OF RIGHT SHOULDER: ICD-10-CM

## 2018-08-21 DIAGNOSIS — M75.51 SUBACROMIAL BURSITIS OF RIGHT SHOULDER JOINT: ICD-10-CM

## 2018-08-21 DIAGNOSIS — M77.8 TENDONITIS OF SHOULDER, RIGHT: ICD-10-CM

## 2018-08-21 PROCEDURE — 97110 THERAPEUTIC EXERCISES: CPT

## 2018-08-21 PROCEDURE — 97140 MANUAL THERAPY 1/> REGIONS: CPT

## 2018-08-21 NOTE — PROGRESS NOTES
Daily Note     Today's date: 2018  Patient name: Roger Dumont  : 1960  MRN: [id number]  Referring provider: Brandi Ellison DO  Dx:   Encounter Diagnosis     ICD-10-CM    1  Right shoulder pain, unspecified chronicity M25 511    2  Tendonitis of shoulder, right M75 81    3  Subacromial bursitis of right shoulder joint M75 51    4  Biceps tendinosis of right shoulder M75 21                   Subjective: Patient reports having minimal soreness 1/10 today  Notes she reached towards the backseat of her car and felt a shooting pain in her shoulder  Notes it went away after awhile, just is sore today      Objective:  Continued with TE progression focusing on maintaining pain free ROM    See treatment diary below  Precautions: none         Daily Treatment Diary      Manual           IR stretching 5'  10'  10'  8'  8'  8'  8'         Inferior glides    p!                                                                                                 Exercise Diary           Sleeper stretch IR :20x3  :20x3  :20x4 :20x4 :20x4 :20x4  :20x4         Table slides flexion :05x10                     pendulums      20                 No pulley's yet                       pball rolls on table     :05 10x :05 15x   :05 20x  :05 20x  :05 20x         RTC isometrics   :05x20 ER, ext :05 x20 ER, ext, IR :05 x20 ER, ext, IR :05 20x ER, ext, IR, flex  :05 20x ER, ext, IR, flex  :05 20x ER, ext, IR, flex          SL ER          30x  30x  30x          prone row          30x  30x  30x         TB rows y 20  red :10x10  red :10x10  red :10 x15  red 3x10  red 3x10  red 3x10         TB ext y20  red :10x10  red :10x10  red :10 x15  red 3x10  red 3x10  red 3x10         TB ER bilat y20  red :10x10 partial  red :10x10  red :10 x15  red 3x10  red 3x10  red 3x10          TB IR    red :10x10  red :10x10  red :10 x15  red 3x10  red 3x10  red 3x10         TB w's when able         yellow 2x10 yellow 3x10  yellow 3x10         Cane ext    #2 :10x10  2# :10x10  2# :10 x10  2# :10 10x  3# :10 10x  3# :10 10x         UBE retro           5'  5'  5'                                                                                                                                                                                       Modalities                                                                                                     Assessment: Tolerated treatment well still being limited in IR but did increase post manuals  Pt had no increased symptoms throughout session  Patient demonstrated fatigue post treatment and would benefit from continued PT      Plan: Progress treatment as tolerated

## 2018-08-23 ENCOUNTER — OFFICE VISIT (OUTPATIENT)
Dept: PHYSICAL THERAPY | Facility: CLINIC | Age: 58
End: 2018-08-23
Payer: COMMERCIAL

## 2018-08-23 DIAGNOSIS — M77.8 TENDONITIS OF SHOULDER, RIGHT: ICD-10-CM

## 2018-08-23 DIAGNOSIS — M67.813 BICEPS TENDINOSIS OF RIGHT SHOULDER: ICD-10-CM

## 2018-08-23 DIAGNOSIS — M75.51 SUBACROMIAL BURSITIS OF RIGHT SHOULDER JOINT: ICD-10-CM

## 2018-08-23 DIAGNOSIS — M25.511 RIGHT SHOULDER PAIN, UNSPECIFIED CHRONICITY: Primary | ICD-10-CM

## 2018-08-23 PROCEDURE — 97110 THERAPEUTIC EXERCISES: CPT

## 2018-08-23 PROCEDURE — 97112 NEUROMUSCULAR REEDUCATION: CPT

## 2018-08-23 PROCEDURE — 97140 MANUAL THERAPY 1/> REGIONS: CPT

## 2018-08-23 NOTE — PROGRESS NOTES
Daily Note     Today's date: 2018  Patient name: Deion Barrera  : 1960  MRN: [id number]  Referring provider: Caitie Zaldivar DO  Dx:   Encounter Diagnosis     ICD-10-CM    1  Right shoulder pain, unspecified chronicity M25 511    2  Tendonitis of shoulder, right M75 81    3  Subacromial bursitis of right shoulder joint M75 51    4  Biceps tendinosis of right shoulder M75 21                   Subjective: Patient reports continued intermittent shoulder pain, notes she put her hand behind her head when laying in bed this morning and had "a terrible pain" when lowering her arm back down    She feels her progress has been slow up to this point        Objective:  See treatment diary below   Precautions: none         Daily Treatment Diary      Manual         IR stretching anterior to frontal plane 5'  10'  10'  8'  8'  8'  8'  8'       Inferior and posterior glides    p!           PT SY                                                                                    Exercise Diary         Sleeper stretch IR :20x3  :20x3  :20x4 :20x4 :20x4 :20x4 :20x4 :20x4       Table slides flexion :05x10                     pendulums      20                 No pulley's yet                       pball rolls on table     :05 10x :05 15x   :05 20x  :05 20x  :05 20x  :05 20x       RTC isometrics   :05x20 ER, ext :05 x20 ER, ext, IR :05 x20 ER, ext, IR :05 20x ER, ext, IR, flex  :05 20x ER, ext, IR, flex  :05 20x ER, ext, IR, flex          SL ER          30x  30x  30x  1# 20x        prone row          30x  30x  30x  2# 30x       TB rows y 20  red :10x10  red :10x10  red :10 x15  red 3x10  red 3x10  red 3x10 green 3x10       TB ext y20  red :10x10  red :10x10  red :10 x15  red 3x10  red 3x10  red 3x10 green 3x10       TB ER bilat y20  red :10x10 partial  red :10x10  red :10 x15  red 3x10  red 3x10  red 3x10   red 3x10       TB IR    red :10x10  red :10x10  red :10 x15  red 3x10  red 3x10  red 3x10  red 3x10       TB w's when able         yellow 2x10 yellow 3x10  yellow 3x10  red 3x10       Cane ext    #2 :10x10  2# :10x10  2# :10 x10  2# :10 10x  3# :10 10x  3# :10 10x  3# :10 x10       UBE retro           5'  5'  5'  5'                                                                                                                                                                                     Modalities                                                                                                     Assessment: Tolerated treatment well  Inferior and posterior glides resumed today, MT performed in scapular pain to avoid increased pain  Cues needed to maintain pain free ROM with all exercises with fair carry over  Patient demonstrated fatigue post treatment and would benefit from continued PT        Plan: Progress treatment as tolerated

## 2018-08-28 ENCOUNTER — OFFICE VISIT (OUTPATIENT)
Dept: PHYSICAL THERAPY | Facility: CLINIC | Age: 58
End: 2018-08-28
Payer: COMMERCIAL

## 2018-08-28 DIAGNOSIS — M77.8 TENDONITIS OF SHOULDER, RIGHT: ICD-10-CM

## 2018-08-28 DIAGNOSIS — M25.511 RIGHT SHOULDER PAIN, UNSPECIFIED CHRONICITY: Primary | ICD-10-CM

## 2018-08-28 DIAGNOSIS — M75.51 SUBACROMIAL BURSITIS OF RIGHT SHOULDER JOINT: ICD-10-CM

## 2018-08-28 DIAGNOSIS — M67.813 BICEPS TENDINOSIS OF RIGHT SHOULDER: ICD-10-CM

## 2018-08-28 LAB — HBA1C MFR BLD HPLC: 5.7 %

## 2018-08-28 PROCEDURE — 97112 NEUROMUSCULAR REEDUCATION: CPT

## 2018-08-28 PROCEDURE — 97110 THERAPEUTIC EXERCISES: CPT

## 2018-08-28 PROCEDURE — 97140 MANUAL THERAPY 1/> REGIONS: CPT

## 2018-08-28 NOTE — PROGRESS NOTES
Daily Note     Today's date: 2018  Patient name: Jay Lawrence  : 1960  MRN: [id number]  Referring provider: Brent Frye DO  Dx:   Encounter Diagnosis     ICD-10-CM    1  Right shoulder pain, unspecified chronicity M25 511    2  Tendonitis of shoulder, right M75 81    3  Subacromial bursitis of right shoulder joint M75 51    4  Biceps tendinosis of right shoulder M75 21                   Subjective: Patient reports periscapular muscle soreness following last session, she continues to have intermittent shoulder pain with overhead activites and quick reaching into abduction         Objective:  See treatment diary below    Precautions: none         Daily Treatment Diary      Manual       IR stretching anterior to frontal plane 5'  10'  10'  8'  8'  8'  8'  8'  10'     Inferior and posterior glides    p!           PT SY                                                                                     Exercise Diary       Sleeper stretch IR :20x3  :20x3  :20x4 :20x4 :20x4 :20x4 :20x4 :20x4 :20x4     Table slides flexion :05x10                     pendulums      20                 No pulley's yet                       pball rolls on table     :05 10x :05 15x   :05 20x  :05 20x  :05 20x  :05 20x :05 20x     RTC isometrics   :05x20 ER, ext :05 x20 ER, ext, IR :05 x20 ER, ext, IR :05 20x ER, ext, IR, flex  :05 20x ER, ext, IR, flex  :05 20x ER, ext, IR, flex   :05 20x abd, ext      SL ER          30x  30x  30x  1# 20x  1# 30x      prone row          30x  30x  30x  2# 30x  3# 30x     TB rows y 20  red :10x10  red :10x10  red :10 x15  red 3x10  red 3x10  red 3x10 green 3x10 green 3x10     TB ext y20  red :10x10  red :10x10  red :10 x15  red 3x10  red 3x10  red 3x10 green 3x10 green 3x10     TB ER bilat y20  red :10x10 partial  red :10x10  red :10 x15  red 3x10  red 3x10  red 3x10   red 3x10  red 3x10   TB IR    red :10x10  red :10x10  red :10 x15  red 3x10  red 3x10  red 3x10  red 3x10  red 3x10     TB w's when able         yellow 2x10 yellow 3x10  yellow 3x10  red 3x10 yellow 3x10     Cane ext    #2 :10x10  2# :10x10  2# :10 x10  2# :10 10x  3# :10 10x  3# :10 10x  3# :10 x10  3# :10 10x     UBE retro           5'  5'  5'  5'  5'                                                                                                                                                                                   Modalities                                                                                                      Assessment: Tolerated treatment well  Continues discomfort initially with manual IR stretching, subsides after several minutes of stretching  No increased pain with exercises performed today though patient notes her arm feeling tired at conclusion of session  Patient demonstrated fatigue post treatment and would benefit from continued PT      Plan: Progress treatment as tolerated

## 2018-08-30 ENCOUNTER — EVALUATION (OUTPATIENT)
Dept: PHYSICAL THERAPY | Facility: CLINIC | Age: 58
End: 2018-08-30
Payer: COMMERCIAL

## 2018-08-30 DIAGNOSIS — M67.813 BICEPS TENDINOSIS OF RIGHT SHOULDER: ICD-10-CM

## 2018-08-30 DIAGNOSIS — M75.51 SUBACROMIAL BURSITIS OF RIGHT SHOULDER JOINT: ICD-10-CM

## 2018-08-30 DIAGNOSIS — M77.8 TENDONITIS OF SHOULDER, RIGHT: ICD-10-CM

## 2018-08-30 DIAGNOSIS — M25.511 RIGHT SHOULDER PAIN, UNSPECIFIED CHRONICITY: Primary | ICD-10-CM

## 2018-08-30 PROCEDURE — 97110 THERAPEUTIC EXERCISES: CPT | Performed by: PHYSICAL THERAPIST

## 2018-08-30 PROCEDURE — 97140 MANUAL THERAPY 1/> REGIONS: CPT | Performed by: PHYSICAL THERAPIST

## 2018-08-30 NOTE — PROGRESS NOTES
PT Re-Evaluation      Today's date: 2018  Patient name: Andrei Chester  : 1960  MRN: [id number]  Referring provider: Sarah Woods DO  Dx:         Encounter Diagnosis       ICD-10-CM     1  Right shoulder pain, unspecified chronicity M25 511 Ambulatory referral to Physical Therapy   2  Tendonitis of shoulder, right M75 81 Ambulatory referral to Physical Therapy   3  Subacromial bursitis of right shoulder joint M75 51 Ambulatory referral to Physical Therapy            Assessment  Assessment details: Patient's ROM has decreased despite regular stretching and pain remains consistent with reaching behind the back and overhead  Due to increasing tightness and pain at all end range movements, there is a concern for developing adhesive capsulitis  Recommend return to Dr Erick Gaviria for follow-up  Impairments: abnormal or restricted ROM, impaired physical strength, and pain with function    Understanding of Dx/Px/POC: good   Prognosis: fair     Goals  STG Patient is independent with HEP  (met)  STG Increase strength by half grade in 2 weeks (not met)  STG Range of motion is improved by 25% in 4 weeks (not met)  LTG Flexibility is improved in 4 weeks (not met)  LTG ADL performance improved to prior level of function (not met)     Plan  Patient would benefit from: skilled physical therapy  Planned therapy interventions: manual therapy, joint mobilization, strengthening, therapeutic activities, therapeutic exercise, stretching, graded activity, home exercise program and functional ROM exercises    Frequency: 2x week  Duration in visits: 8  Duration in weeks: 4  Treatment plan discussed with: patient         Subjective Evaluation     History of Present Illness  Mechanism of injury: Patient reports onset right shoulder pain starting in the fall of     Pain increased and decreased over time and eventually she had an MRI which revealed supraspinatus/infraspinatus and proximal biceps tendinosis with SA bursitis  Patient reports no change in symptoms with continued pain reaching behind the back, dressing, and reaching overhead  Pain  Current pain ratin  At worst pain ratin  Quality: dull ache  Relieving factors: rest     Patient Goals  Patient goals for therapy: increased strength, independence with ADLs/IADLs, decreased pain and increased motion        Objective      Active Range of Motion   Left Shoulder   Flexion: 170 degrees   Extension: 75 degrees   Abduction: 175 degrees     Right Shoulder   Flexion: 121 (from 130 degrees)  Extension: 46 (from 40 degrees)   Abduction: 120 (from 135 degrees)    Additional Active Range of Motion Details  Reach up low back (C7 to thumb)  7cm left, 33cm right (22cm)     Passive Range of Motion      Right Shoulder  (all movements painful)  Flexion: 135 degrees   Abduction: 125 degrees   External rotation 90°: 70 degrees (from 90 degrees)   Internal rotation 45°: 42 degrees (from 35 degrees)      Strength/Myotome Testing     Right Shoulder      Planes of Motion   External rotation at 0°: 4   Internal rotation at 0°: 4          Daily Note     Today's date: 2018  Patient name: Deirck Billy  : 1960  MRN: [id number]  Referring provider: Lorri Garibay DO  Dx:   Encounter Diagnosis     ICD-10-CM    1  Right shoulder pain, unspecified chronicity M25 511    2  Tendonitis of shoulder, right M75 81    3  Subacromial bursitis of right shoulder joint M75 51    4   Biceps tendinosis of right shoulder M75 21                 Precautions: none         Daily Treatment Diary      Manual     IR stretching anterior to frontal plane 5'  10'  10'  8'  8'  8'  8'  8'  10'  10'   Inferior and posterior glides    p!           PT SY                                                                                     Exercise Diary     Sleeper stretch IR :20x3  :20x3  :20x4 :20x4 :20x4 :20x4 :20x4 :20x4 :20x4  :20x4 + ER   Table slides flexion :05x10                  :10x10   pendulums      20                 No pulley's yet                    3'   pball rolls on table     :05 10x :05 15x   :05 20x  :05 20x  :05 20x  :05 20x :05 20x  :05x20   RTC isometrics   :05x20 ER, ext :05 x20 ER, ext, IR :05 x20 ER, ext, IR :05 20x ER, ext, IR, flex  :05 20x ER, ext, IR, flex  :05 20x ER, ext, IR, flex   :05 20x abd, ext      SL ER          30x  30x  30x  1# 20x  1# 30x      prone row          30x  30x  30x  2# 30x  3# 30x     TB rows y 20  red :10x10  red :10x10  red :10 x15  red 3x10  red 3x10  red 3x10 green 3x10 green 3x10     TB ext y20  red :10x10  red :10x10  red :10 x15  red 3x10  red 3x10  red 3x10 green 3x10 green 3x10     TB ER bilat y20  red :10x10 partial  red :10x10  red :10 x15  red 3x10  red 3x10  red 3x10   red 3x10  red 3x10     TB IR    red :10x10  red :10x10  red :10 x15  red 3x10  red 3x10  red 3x10  red 3x10  red 3x10     TB w's when able         yellow 2x10 yellow 3x10  yellow 3x10  red 3x10 yellow 3x10     Cane ext    #2 :10x10  2# :10x10  2# :10 x10  2# :10 10x  3# :10 10x  3# :10 10x  3# :10 x10  3# :10 10x     UBE retro           5'  5'  5'  5'  5'      cane flexion supine                 :05x10                                                                                                                                                           Modalities                                                                                                       Plan: Progress treatment as tolerated

## 2018-09-05 DIAGNOSIS — F41.9 ANXIETY: ICD-10-CM

## 2018-09-05 RX ORDER — ALPRAZOLAM 0.25 MG/1
0.25 TABLET ORAL
Qty: 90 TABLET | Refills: 0 | Status: SHIPPED | OUTPATIENT
Start: 2018-09-05 | End: 2018-12-03 | Stop reason: SDUPTHER

## 2018-09-10 ENCOUNTER — OFFICE VISIT (OUTPATIENT)
Dept: OBGYN CLINIC | Facility: MEDICAL CENTER | Age: 58
End: 2018-09-10
Payer: COMMERCIAL

## 2018-09-10 VITALS
HEIGHT: 65 IN | SYSTOLIC BLOOD PRESSURE: 112 MMHG | BODY MASS INDEX: 20.03 KG/M2 | HEART RATE: 56 BPM | DIASTOLIC BLOOD PRESSURE: 56 MMHG | WEIGHT: 120.2 LBS

## 2018-09-10 DIAGNOSIS — G89.29 CHRONIC RIGHT SHOULDER PAIN: ICD-10-CM

## 2018-09-10 DIAGNOSIS — M77.8 TENDONITIS OF SHOULDER, RIGHT: ICD-10-CM

## 2018-09-10 DIAGNOSIS — M25.511 CHRONIC RIGHT SHOULDER PAIN: ICD-10-CM

## 2018-09-10 DIAGNOSIS — M67.813 BICEPS TENDINOSIS OF RIGHT SHOULDER: Primary | ICD-10-CM

## 2018-09-10 DIAGNOSIS — M75.51 SUBACROMIAL BURSITIS OF RIGHT SHOULDER JOINT: ICD-10-CM

## 2018-09-10 PROCEDURE — 99213 OFFICE O/P EST LOW 20 MIN: CPT | Performed by: FAMILY MEDICINE

## 2018-09-10 NOTE — ASSESSMENT & PLAN NOTE
Patient with chronic right shoulder pain despite completing physical therapy and cortisone injections  I do suspect that there is an underlying labrum tear of the shoulder contributing to her current symptoms  I will recommend a repeat MRI with contrast to evaluate for acetabulum labrum tear  Her previous MRI was done without contrast which may not pickup these types of injuries    Follow-up after completion of shoulder arthrogram

## 2018-09-10 NOTE — PROGRESS NOTES
Assessment:     1  Biceps tendinosis of right shoulder    2  Subacromial bursitis of right shoulder joint    3  Tendonitis of shoulder, right    4  Chronic right shoulder pain        Plan:     Problem List Items Addressed This Visit     Subacromial bursitis of right shoulder joint    Tendonitis of shoulder, right    Biceps tendinosis of right shoulder - Primary    Chronic right shoulder pain     Patient with chronic right shoulder pain despite completing physical therapy and cortisone injections  I do suspect that there is an underlying labrum tear of the shoulder contributing to her current symptoms  I will recommend a repeat MRI with contrast to evaluate for acetabulum labrum tear  Her previous MRI was done without contrast which may not pickup these types of injuries  Follow-up after completion of shoulder arthrogram               Subjective:     Patient ID: Araseli Barnes is a 59 y o  female  Chief Complaint:  Patient continues with pain in the right shoulder specifically when reaching up above her head and when reaching behind her back while getting dressed  She reports no noted improvements since completing physical therapy  In fact she states that her pain has become worse  Pain continues as a throbbing, achy pain in the posterior aspect the shoulder that radiates down the right arm  No associated numbness or tingling  No crepitus, warmth or swelling  Allergy:  Allergies   Allergen Reactions    Azithromycin Hives     Reaction Date: 03Aug2011;     Penicillins Rash     Medications:  all current active meds have been reviewed  Past Medical History:  Past Medical History:   Diagnosis Date    Fractures      Past Surgical History:  Past Surgical History:   Procedure Laterality Date    BREAST SURGERY Left     Removal of fibroid tumor   Resolved: 1980    CATARACT EXTRACTION      Last assessed: 10/11/16    DIAGNOSTIC LAPAROSCOPY  1982    ENDOMETRIAL BIOPSY  1982    w/o cervical dilation    TONSILLECTOMY      WISDOM TOOTH EXTRACTION      Resolved: 0     Family History:  Family History   Problem Relation Age of Onset    Diabetes Mother         Mellitus    Stroke Mother         CVA    Osteoporosis Mother     Hypertension Mother     Hypertension Father     Diabetes Maternal Grandfather         Mellitus    Heart attack Paternal Grandfather         Myocardial Infarction    Breast cancer Paternal Aunt      Social History:  History   Alcohol Use    Yes     Comment: Acute     History   Drug Use No     History   Smoking Status    Never Smoker   Smokeless Tobacco    Never Used     Review of Systems   Constitutional: Negative  HENT: Negative  Eyes: Negative  Respiratory: Negative  Cardiovascular: Negative  Gastrointestinal: Negative  Genitourinary: Negative  Musculoskeletal: Positive for arthralgias and myalgias  Skin: Negative  Allergic/Immunologic: Negative  Neurological: Negative  Hematological: Negative  Psychiatric/Behavioral: Negative  Objective:  BP Readings from Last 1 Encounters:   09/10/18 112/56      Wt Readings from Last 1 Encounters:   09/10/18 54 5 kg (120 lb 3 2 oz)      BMI:   Estimated body mass index is 20 kg/m² as calculated from the following:    Height as of this encounter: 5' 5" (1 651 m)  Weight as of this encounter: 54 5 kg (120 lb 3 2 oz)  BSA:   Estimated body surface area is 1 59 meters squared as calculated from the following:    Height as of this encounter: 5' 5" (1 651 m)  Weight as of this encounter: 54 5 kg (120 lb 3 2 oz)  Physical Exam   Constitutional: She is oriented to person, place, and time  Vital signs are normal  She appears well-developed  HENT:   Head: Normocephalic  Eyes: Pupils are equal, round, and reactive to light  Pulmonary/Chest: Effort normal    Musculoskeletal: Normal range of motion  Neurological: She is alert and oriented to person, place, and time  Skin: Skin is warm and dry  Psychiatric: She has a normal mood and affect  Nursing note and vitals reviewed  Right Shoulder Exam     Tenderness   The patient is experiencing tenderness in the acromioclavicular joint, biceps tendon and acromion  Muscle Strength   Abduction: 4/5   Internal Rotation: 4/5   External Rotation: 4/5   Supraspinatus: 4/5   Subscapularis: 4/5   Biceps: 4/5     Tests   Apprehension: positive  Hawkin's test: positive  Impingement: positive    Other   Erythema: absent  Sensation: normal  Pulse: present      Left Shoulder Exam   Left shoulder exam is normal     Tenderness   The patient is experiencing no tenderness  Range of Motion   The patient has normal left shoulder ROM  I have personally reviewed pertinent films in PACS

## 2018-09-19 ENCOUNTER — TELEPHONE (OUTPATIENT)
Dept: FAMILY MEDICINE CLINIC | Facility: CLINIC | Age: 58
End: 2018-09-19

## 2018-09-20 ENCOUNTER — ANNUAL EXAM (OUTPATIENT)
Dept: GYNECOLOGY | Facility: CLINIC | Age: 58
End: 2018-09-20
Payer: COMMERCIAL

## 2018-09-20 VITALS
HEIGHT: 65 IN | SYSTOLIC BLOOD PRESSURE: 100 MMHG | WEIGHT: 119 LBS | HEART RATE: 60 BPM | BODY MASS INDEX: 19.83 KG/M2 | DIASTOLIC BLOOD PRESSURE: 68 MMHG | RESPIRATION RATE: 14 BRPM

## 2018-09-20 DIAGNOSIS — Z12.31 ENCOUNTER FOR SCREENING MAMMOGRAM FOR BREAST CANCER: ICD-10-CM

## 2018-09-20 DIAGNOSIS — Z01.419 ENCOUNTER FOR ANNUAL ROUTINE GYNECOLOGICAL EXAMINATION: Primary | ICD-10-CM

## 2018-09-20 PROCEDURE — S0612 ANNUAL GYNECOLOGICAL EXAMINA: HCPCS | Performed by: OBSTETRICS & GYNECOLOGY

## 2018-09-20 NOTE — PROGRESS NOTES
Assessment/Plan:  Normal gynecologic exam  Co-testing '  Return the office in 1 year  Self breast exams monthly  Annual 3-D mammography  Colonoscopy- fecal DNA through PCP 10/16  Calcium 1000 milligrams with vitamin D- does  Exercise 3 times a week- does  Diagnoses and all orders for this visit:    Encounter for annual routine gynecological examination    Encounter for screening mammogram for breast cancer  -     Mammo screening bilateral w 3d & cad; Future              Subjective:        Patient ID: Dania Bah is a 59 y o  female  Lavetta Soulier is here for an annual visit  She is without complaints  Her mother passed away this July  She denies any bleeding  She is not sexually active  The following portions of the patient's history were reviewed and updated as appropriate: She  has a past medical history of Fractures  Patient Active Problem List    Diagnosis Date Noted    Encounter for annual routine gynecological examination 2018    Encounter for screening mammogram for breast cancer 2018    Chronic right shoulder pain 09/10/2018    Subacromial bursitis of right shoulder joint 2018    Tendonitis of shoulder, right 2018    Biceps tendinosis of right shoulder 2018   PMH:      1 para 1;    Menopause 50  Anxiety 23  Dysmenorrhea  Primary Infertility- diagnostic laparoscopy with endometrial biopsy   Left  excisional 1  breast biopsy   Menopausal symptoms '  Bilateral cataracts removed ,   Right shoulder pain- ? Labral Tear- for MRI with contrast, s/p oral Steroids, PT, and Steroid Inj     She  has a past surgical history that includes Endometrial biopsy (); Breast surgery (Left); Cataract extraction; Diagnostic laparoscopy (); [place] tooth extraction; and Tonsillectomy    Her family history includes Breast cancer in her paternal aunt; Diabetes in her maternal grandfather and mother; Heart attack in her paternal grandfather; Hypertension in her father and mother; Osteoporosis in her mother; Stroke in her mother  FH:  M 76- poorly controlled diabetes leading to several wound problems and kidney disease  F- 79 DM, Fall 7/16-fractured femur (surgery); screws broke, Hip Replacement 10/16; 2 episodes of Hip                       Disclocations, d 7/18       B- nervous breakdown 54 7/18  She  reports that she has never smoked  She has never used smokeless tobacco  She reports that she drinks alcohol  She reports that she does not use drugs  SH:  [address]    Edi Porter 8/88- , still lives with them  She took Pat for a cruise to celebrate her 58th  Current Outpatient Prescriptions   Medication Sig Dispense Refill    ALPRAZolam (XANAX) 0 25 mg tablet Take 1 tablet (0 25 mg total) by mouth daily at bedtime as needed for anxiety 90 tablet 0    FLUoxetine (PROzac) 20 mg capsule Take 1 capsule (20 mg total) by mouth daily 90 capsule 3    Multiple Vitamins-Minerals (MULTIVITAMIN ADULTS) TABS Take 1 tablet by mouth daily       No current facility-administered medications for this visit  Current Outpatient Prescriptions on File Prior to Visit   Medication Sig    ALPRAZolam (XANAX) 0 25 mg tablet Take 1 tablet (0 25 mg total) by mouth daily at bedtime as needed for anxiety    FLUoxetine (PROzac) 20 mg capsule Take 1 capsule (20 mg total) by mouth daily    Multiple Vitamins-Minerals (MULTIVITAMIN ADULTS) TABS Take 1 tablet by mouth daily     No current facility-administered medications on file prior to visit  She is allergic to azithromycin and penicillins       Review of Systems   Constitutional: Negative for activity change, appetite change, fatigue and unexpected weight change  Eyes: Negative for visual disturbance  Respiratory: Negative for cough, chest tightness, shortness of breath and wheezing  Cardiovascular: Negative for chest pain, palpitations and leg swelling  Breast: Patient denies tenderness, nipple discharge, masses, or erythema  Gastrointestinal: Negative for abdominal distention, abdominal pain, blood in stool, constipation, diarrhea, nausea and vomiting  Endocrine: Negative for cold intolerance and heat intolerance  Genitourinary: Negative for decreased urine volume, difficulty urinating, dyspareunia, dysuria, frequency, hematuria, menstrual problem, pelvic pain, urgency, vaginal bleeding, vaginal discharge and vaginal pain  Musculoskeletal: Positive for arthralgias (Right shoulder)  Skin: Negative for rash  Neurological: Negative for weakness, light-headedness, numbness and headaches  Hematological: Does not bruise/bleed easily  Psychiatric/Behavioral: Negative for agitation, behavioral problems and sleep disturbance  The patient is not nervous/anxious            Objective:    Vitals:    09/20/18 1605   BP: 100/68   BP Location: Right arm   Patient Position: Sitting   Cuff Size: Standard   Pulse: 60   Resp: 14   Weight: 54 kg (119 lb)   Height: 5' 5" (1 651 m)            Physical Exam

## 2018-09-28 ENCOUNTER — HOSPITAL ENCOUNTER (OUTPATIENT)
Dept: RADIOLOGY | Facility: HOSPITAL | Age: 58
Discharge: HOME | End: 2018-09-28
Attending: FAMILY MEDICINE | Admitting: RADIOLOGY
Payer: COMMERCIAL

## 2018-09-28 ENCOUNTER — HOSPITAL ENCOUNTER (OUTPATIENT)
Dept: RADIOLOGY | Facility: HOSPITAL | Age: 58
Discharge: HOME | End: 2018-09-28
Attending: FAMILY MEDICINE
Payer: COMMERCIAL

## 2018-09-28 DIAGNOSIS — M67.813 BICEPS TENDINOSIS OF RIGHT SHOULDER: ICD-10-CM

## 2018-09-28 DIAGNOSIS — M77.8 TENDONITIS OF SHOULDER, RIGHT: ICD-10-CM

## 2018-09-28 DIAGNOSIS — M25.511 CHRONIC RIGHT SHOULDER PAIN: ICD-10-CM

## 2018-09-28 DIAGNOSIS — G89.29 CHRONIC RIGHT SHOULDER PAIN: ICD-10-CM

## 2018-09-28 DIAGNOSIS — M75.51 SUBACROMIAL BURSITIS OF RIGHT SHOULDER JOINT: ICD-10-CM

## 2018-09-28 PROCEDURE — 23350 INJECTION FOR SHOULDER X-RAY: CPT

## 2018-09-28 PROCEDURE — 73222 MRI JOINT UPR EXTREM W/DYE: CPT

## 2018-09-28 PROCEDURE — 77002 NEEDLE LOCALIZATION BY XRAY: CPT

## 2018-09-28 PROCEDURE — A9585 GADOBUTROL INJECTION: HCPCS | Performed by: RADIOLOGY

## 2018-09-28 RX ORDER — LIDOCAINE HYDROCHLORIDE 10 MG/ML
10 INJECTION, SOLUTION INFILTRATION; PERINEURAL
Status: COMPLETED | OUTPATIENT
Start: 2018-09-28 | End: 2018-09-28

## 2018-09-28 RX ORDER — 0.9 % SODIUM CHLORIDE 0.9 %
5 VIAL (ML) INJECTION
Status: DISCONTINUED | OUTPATIENT
Start: 2018-09-28 | End: 2018-09-28

## 2018-09-28 RX ORDER — LIDOCAINE HYDROCHLORIDE 10 MG/ML
10 INJECTION, SOLUTION INFILTRATION; PERINEURAL
Status: DISCONTINUED | OUTPATIENT
Start: 2018-09-28 | End: 2018-09-28

## 2018-09-28 RX ORDER — 0.9 % SODIUM CHLORIDE 0.9 %
5 VIAL (ML) INJECTION
Status: COMPLETED | OUTPATIENT
Start: 2018-09-28 | End: 2018-09-28

## 2018-09-28 RX ADMIN — LIDOCAINE HYDROCHLORIDE 2 ML: 10 INJECTION, SOLUTION INFILTRATION; PERINEURAL at 16:07

## 2018-09-28 RX ADMIN — GADOBUTROL 2 ML: 604.72 INJECTION INTRAVENOUS at 16:07

## 2018-09-28 RX ADMIN — SODIUM CHLORIDE 1 ML: 9 INJECTION, SOLUTION INTRAMUSCULAR; INTRAVENOUS; SUBCUTANEOUS at 16:07

## 2018-09-28 RX ADMIN — IOHEXOL 3 ML: 300 INJECTION, SOLUTION INTRAVENOUS at 16:07

## 2018-10-01 ENCOUNTER — OFFICE VISIT (OUTPATIENT)
Dept: OBGYN CLINIC | Facility: MEDICAL CENTER | Age: 58
End: 2018-10-01

## 2018-10-01 VITALS
WEIGHT: 121.2 LBS | DIASTOLIC BLOOD PRESSURE: 78 MMHG | HEART RATE: 52 BPM | HEIGHT: 65 IN | BODY MASS INDEX: 20.19 KG/M2 | SYSTOLIC BLOOD PRESSURE: 124 MMHG

## 2018-10-01 DIAGNOSIS — G89.29 CHRONIC RIGHT SHOULDER PAIN: ICD-10-CM

## 2018-10-01 DIAGNOSIS — M75.01 ADHESIVE CAPSULITIS OF RIGHT SHOULDER: ICD-10-CM

## 2018-10-01 DIAGNOSIS — M25.511 CHRONIC RIGHT SHOULDER PAIN: ICD-10-CM

## 2018-10-01 DIAGNOSIS — M77.8 TENDONITIS OF SHOULDER, RIGHT: ICD-10-CM

## 2018-10-01 DIAGNOSIS — M67.813 BICEPS TENDINOSIS OF RIGHT SHOULDER: Primary | ICD-10-CM

## 2018-10-01 PROCEDURE — 99214 OFFICE O/P EST MOD 30 MIN: CPT | Performed by: FAMILY MEDICINE

## 2018-10-01 NOTE — ASSESSMENT & PLAN NOTE
MRI results have been reviewed and discussed with the patient  Patient should begin naproxen twice a day as needed for pain  Continue with home shoulder exercises as previously provided  Given her worsening overall symptoms including pain and motion restrictions after completing physical therapy and receiving a cortisone injection, I will recommend referral to Orthopedics for further evaluation and management

## 2018-10-01 NOTE — PROGRESS NOTES
Assessment:     1  Biceps tendinosis of right shoulder    2  Tendonitis of shoulder, right    3  Chronic right shoulder pain    4  Adhesive capsulitis of right shoulder        Plan:     Problem List Items Addressed This Visit     Tendonitis of shoulder, right    Relevant Orders    Ambulatory referral to Orthopedic Surgery    Biceps tendinosis of right shoulder - Primary    Relevant Orders    Ambulatory referral to Orthopedic Surgery    Chronic right shoulder pain    Relevant Orders    Ambulatory referral to Orthopedic Surgery    Adhesive capsulitis of right shoulder     MRI results have been reviewed and discussed with the patient  Patient should begin naproxen twice a day as needed for pain  Continue with home shoulder exercises as previously provided  Given her worsening overall symptoms including pain and motion restrictions after completing physical therapy and receiving a cortisone injection, I will recommend referral to Orthopedics for further evaluation and management  Subjective:     Patient ID: Allison Daigle is a 59 y o  female  Chief Complaint:  Patient presents today for follow-up of her right shoulder pain and MRI results  Patient reports no improvement of her shoulder pain and range of motion  In fact, she states that her pain has become worse and her range of motion is becoming less  She had been provided previously with a cortisone injection in July 2018 which today she states provided very minimal to no relief  She continues with reproducible pain when attempting to raise are arm up  Pain is reproduced with activities of daily living including getting dressed  She denies any snapping or clicking  She denies any swelling, warmth or crepitus        Allergy:  Allergies   Allergen Reactions    Azithromycin Hives     Reaction Date: 03Aug2011;     Penicillins Rash     Medications:  all current active meds have been reviewed  Past Medical History:  Past Medical History:   Diagnosis Date    Fractures      Past Surgical History:  Past Surgical History:   Procedure Laterality Date    BREAST SURGERY Left     Removal of fibroid tumor  Resolved: 1980    CATARACT EXTRACTION      Last assessed: 10/11/16    DIAGNOSTIC LAPAROSCOPY  1982    ENDOMETRIAL BIOPSY  1982    w/o cervical dilation    [place] INJECTION RIGHT SHOULDER (ARTHROGRAM)  9/28/2018    TONSILLECTOMY      WISDOM TOOTH EXTRACTION      Resolved: 1977     Family History:  Family History   Problem Relation Age of Onset    Diabetes Mother         Mellitus    Stroke Mother         CVA    Osteoporosis Mother     Hypertension Mother     Hypertension Father     Diabetes Maternal Grandfather         Mellitus    Heart attack Paternal Grandfather         Myocardial Infarction    Breast cancer Paternal Aunt      Social History:  History   Alcohol Use    Yes     Comment: Acute     History   Drug Use No     History   Smoking Status    Never Smoker   Smokeless Tobacco    Never Used     Review of Systems   Constitutional: Negative  HENT: Negative  Eyes: Negative  Respiratory: Negative  Cardiovascular: Negative  Gastrointestinal: Negative  Genitourinary: Negative  Musculoskeletal: Positive for arthralgias and myalgias  Skin: Negative  Allergic/Immunologic: Negative  Neurological: Negative  Hematological: Negative  Psychiatric/Behavioral: Negative  Objective:  BP Readings from Last 1 Encounters:   10/01/18 124/78      Wt Readings from Last 1 Encounters:   10/01/18 55 kg (121 lb 3 2 oz)      BMI:   Estimated body mass index is 20 17 kg/m² as calculated from the following:    Height as of this encounter: 5' 5" (1 651 m)  Weight as of this encounter: 55 kg (121 lb 3 2 oz)  BSA:   Estimated body surface area is 1 6 meters squared as calculated from the following:    Height as of this encounter: 5' 5" (1 651 m)  Weight as of this encounter: 55 kg (121 lb 3 2 oz)     Physical Exam   Constitutional: She is oriented to person, place, and time  Vital signs are normal  She appears well-developed  HENT:   Head: Normocephalic  Eyes: Pupils are equal, round, and reactive to light  Pulmonary/Chest: Effort normal    Musculoskeletal: She exhibits tenderness  Neurological: She is alert and oriented to person, place, and time  Skin: Skin is warm and dry  Psychiatric: She has a normal mood and affect  Nursing note and vitals reviewed  Right Shoulder Exam     Tenderness   The patient is experiencing tenderness in the acromioclavicular joint, biceps tendon and acromion  Range of Motion   Active Abduction: abnormal   Passive Abduction: abnormal   Extension: abnormal   Forward Flexion: abnormal   External Rotation: abnormal   Internal Rotation 0 degrees: abnormal   Internal Rotation 90 degrees: abnormal     Muscle Strength   Abduction: 4/5   Internal Rotation: 4/5   External Rotation: 4/5   Supraspinatus: 4/5   Subscapularis: 4/5   Biceps: 4/5     Tests   Apprehension: positive  Hawkin's test: positive  Impingement: positive    Other   Erythema: absent  Sensation: normal  Pulse: present      Left Shoulder Exam   Left shoulder exam is normal     Tenderness   The patient is experiencing no tenderness  Range of Motion   The patient has normal left shoulder ROM  I have personally reviewed pertinent films in PACS  No evidence of glenoid labrum tear  Mild long head of biceps tendinosis without tear  Mild supraspinatus and infraspinatus tendinosis without rotator cuff tear

## 2018-10-26 ENCOUNTER — OFFICE VISIT (OUTPATIENT)
Dept: OBGYN CLINIC | Facility: MEDICAL CENTER | Age: 58
End: 2018-10-26
Payer: COMMERCIAL

## 2018-10-26 VITALS
HEART RATE: 58 BPM | HEIGHT: 65 IN | BODY MASS INDEX: 20.16 KG/M2 | WEIGHT: 121 LBS | DIASTOLIC BLOOD PRESSURE: 73 MMHG | SYSTOLIC BLOOD PRESSURE: 123 MMHG

## 2018-10-26 DIAGNOSIS — M75.01 ADHESIVE CAPSULITIS OF RIGHT SHOULDER: Primary | ICD-10-CM

## 2018-10-26 DIAGNOSIS — M77.8 TENDONITIS OF SHOULDER, RIGHT: ICD-10-CM

## 2018-10-26 DIAGNOSIS — G89.29 CHRONIC RIGHT SHOULDER PAIN: ICD-10-CM

## 2018-10-26 DIAGNOSIS — M67.813 BICEPS TENDINOSIS OF RIGHT SHOULDER: ICD-10-CM

## 2018-10-26 DIAGNOSIS — M25.511 CHRONIC RIGHT SHOULDER PAIN: ICD-10-CM

## 2018-10-26 PROCEDURE — 99213 OFFICE O/P EST LOW 20 MIN: CPT | Performed by: ORTHOPAEDIC SURGERY

## 2018-10-26 PROCEDURE — 20610 DRAIN/INJ JOINT/BURSA W/O US: CPT | Performed by: ORTHOPAEDIC SURGERY

## 2018-10-26 RX ORDER — BETAMETHASONE SODIUM PHOSPHATE AND BETAMETHASONE ACETATE 3; 3 MG/ML; MG/ML
12 INJECTION, SUSPENSION INTRA-ARTICULAR; INTRALESIONAL; INTRAMUSCULAR; SOFT TISSUE
Status: COMPLETED | OUTPATIENT
Start: 2018-10-26 | End: 2018-10-26

## 2018-10-26 RX ORDER — LIDOCAINE HYDROCHLORIDE 10 MG/ML
2 INJECTION, SOLUTION INFILTRATION; PERINEURAL
Status: COMPLETED | OUTPATIENT
Start: 2018-10-26 | End: 2018-10-26

## 2018-10-26 RX ORDER — BUPIVACAINE HYDROCHLORIDE 2.5 MG/ML
2 INJECTION, SOLUTION INFILTRATION; PERINEURAL
Status: COMPLETED | OUTPATIENT
Start: 2018-10-26 | End: 2018-10-26

## 2018-10-26 RX ADMIN — LIDOCAINE HYDROCHLORIDE 2 ML: 10 INJECTION, SOLUTION INFILTRATION; PERINEURAL at 14:50

## 2018-10-26 RX ADMIN — BETAMETHASONE SODIUM PHOSPHATE AND BETAMETHASONE ACETATE 12 MG: 3; 3 INJECTION, SUSPENSION INTRA-ARTICULAR; INTRALESIONAL; INTRAMUSCULAR; SOFT TISSUE at 14:50

## 2018-10-26 RX ADMIN — BUPIVACAINE HYDROCHLORIDE 2 ML: 2.5 INJECTION, SOLUTION INFILTRATION; PERINEURAL at 14:50

## 2018-10-26 NOTE — PROGRESS NOTES
Chief complaint;   right shoulder pain, with restricted range of motion, without injury  History;   76-year-old adult right-hand-dominant female, care of onset of insidious right shoulder pain and discomfort in the subacromial space of a year ago  While seeing her internist for an unrelated problem she mentioned her shoulder discomfort and at the conclusion of an examination received prednisone p o  which offered her some benefit  At a L reported with early in 2018 she again had right shoulder discomfort and x-rays were performed which showed an age-appropriate shoulder without significant glenohumeral changes  Subsequent to those x-rays and due to her lack of improvement a initial MRI was performed and then referral to our colleague Dr Millie Zarate  She received an injection of steroid to the shoulder from a posterior approach to the subacromial space and physical therapy for over a month  She recalls no improvement from either the injection or the PT  Also when I ask her if the prednisone p o  helped her better than the injection she believes that did  Since that time she has had an MR arthrogram to identify the potential for a labral injury  She is submitted in consultation by the doctor to us for almost 1 year's worth of right shoulder subacromial pain limitation of function and now decreasing range of motion  Past Medical History:   Diagnosis Date    Fractures        Past Surgical History:   Procedure Laterality Date    BREAST SURGERY Left     Removal of fibroid tumor   Resolved: 1980    CATARACT EXTRACTION      Last assessed: 10/11/16    DIAGNOSTIC LAPAROSCOPY  1982    ENDOMETRIAL BIOPSY  1982    w/o cervical dilation    FL INJECTION RIGHT SHOULDER (ARTHROGRAM)  9/28/2018    TONSILLECTOMY      WISDOM TOOTH EXTRACTION      Resolved: 1977       Family History   Problem Relation Age of Onset    Diabetes Mother         Mellitus    Stroke Mother         CVA    Osteoporosis Mother    Valdez Longoria Hypertension Mother     Hypertension Father     Diabetes Maternal Grandfather         Mellitus    Heart attack Paternal Grandfather         Myocardial Infarction    Breast cancer Paternal Aunt        Social History   Substance Use Topics    Smoking status: Never Smoker    Smokeless tobacco: Never Used    Alcohol use Yes      Comment: Acute     Exam;    She is attired appropriately for inspection and examination of the right shoulder  Seated on the exam table she has slight hypertrophy the slope of the trapezius there is symmetry however of the sternocleidomastoid silhouette its forward flexion is performed with the left arm to 130° the right arm to 100° and ensuing pain  Abduction of the arms is performed with the thumbs-up  She can resist downward pressure from the examiner on the left  She exhibits weakness to 2 finger opposition of the right arm shoulder abduction due to pain  With the elbows held taut to the side of her torso, the right shoulder is resistant to external rotation due to subacromial pain  Palpation of the anterior glenoid labral area and proximal biceps tendon reveals very little discomfort  Attempt at external rotation to place the arm to the also put elicits right shoulder pain attempted internal rotation patient can barely leave the side of her hip due to pain  Imaging;    X-rays of the right shoulder taken previous this calendar year, age-appropriate shoulder without significant degenerative changes  Initial MRI done without contrast shows supra and infraspinatus tendinosis but without a fully retracted rotator cuff tear there is also some inflammation of the proximal biceps tendon      MR arthrogram shows a new labral insult her injury as per the reading radiologist    Large joint arthrocentesis  Date/Time: 10/26/2018 2:50 PM  Procedure Details  Location: shoulder - R glenohumeral  Needle size: 22 G  Ultrasound guidance: no  Approach: posterior  Medications administered: 12 mg betamethasone acetate-betamethasone sodium phosphate 6 (3-3) mg/mL; 2 mL lidocaine 1 %; 2 mL bupivacaine 0 25 %    Patient tolerance: patient tolerated the procedure well with no immediate complications  Dressing:  Sterile dressing applied          Impression;    Right shoulder pain of several months duration approaching a year of atraumatic onset  Limited right shoulder range of motion secondary to pain  Rotator cuff tendinosis  Biceps tendinosis      Plan;    She was offered and accepted a injection to the right shoulder joint at today's appointment  It is important that physical therapy is reordered  We will observe her response to this injection and physical therapy hopefully in the form of increased range of motion and decreased pain    We will see her in follow-up in 6 additional weeks  Her exam was provided by and plan formulated by the attending surgeon it was my privilege to assist him in its delivery

## 2018-10-30 ENCOUNTER — EVALUATION (OUTPATIENT)
Dept: PHYSICAL THERAPY | Facility: CLINIC | Age: 58
End: 2018-10-30
Payer: COMMERCIAL

## 2018-10-30 ENCOUNTER — APPOINTMENT (OUTPATIENT)
Dept: PHYSICAL THERAPY | Facility: CLINIC | Age: 58
End: 2018-10-30
Payer: COMMERCIAL

## 2018-10-30 DIAGNOSIS — M67.813 BICEPS TENDINOSIS OF RIGHT SHOULDER: Primary | ICD-10-CM

## 2018-10-30 DIAGNOSIS — M77.8 TENDONITIS OF SHOULDER, RIGHT: ICD-10-CM

## 2018-10-30 DIAGNOSIS — G89.29 CHRONIC RIGHT SHOULDER PAIN: ICD-10-CM

## 2018-10-30 DIAGNOSIS — M25.511 CHRONIC RIGHT SHOULDER PAIN: ICD-10-CM

## 2018-10-30 PROCEDURE — G8991 OTHER PT/OT GOAL STATUS: HCPCS | Performed by: PHYSICAL THERAPIST

## 2018-10-30 PROCEDURE — 97140 MANUAL THERAPY 1/> REGIONS: CPT | Performed by: PHYSICAL THERAPIST

## 2018-10-30 PROCEDURE — 97110 THERAPEUTIC EXERCISES: CPT | Performed by: PHYSICAL THERAPIST

## 2018-10-30 PROCEDURE — G8990 OTHER PT/OT CURRENT STATUS: HCPCS | Performed by: PHYSICAL THERAPIST

## 2018-10-30 NOTE — PROGRESS NOTES
PT Re-Evaluation      Today's date: 8/30/2018  Patient name: Rashida Quinones  LXX: [id number]  Referring provider: Deloris Martinez DO  Dx:             Encounter Diagnosis       ICD-10-CM     1  Right shoulder pain, unspecified chronicity M25 511 Ambulatory referral to Physical Therapy   2  Tendonitis of shoulder, right M75 81 Ambulatory referral to Physical Therapy   3  Subacromial bursitis of right shoulder joint M75 51 Ambulatory referral to Physical Therapy            Assessment  Assessment details: Patient presents with chronic pain and progressive tightness in the right shoulder  Treatment will focus on improve ROM with frequent home exercises and modalities prn        Impairments: abnormal or restricted ROM, impaired physical strength, and pain with function    Understanding of Dx/Px/POC: good   Prognosis: fair     Goals  STG Patient is independent with HEP  (met)  STG Increase strength by half grade in 2 weeks (not met)  STG Range of motion is improved by 25% in 4 weeks (not met)  LTG Flexibility is improved in 4 weeks (not met)  LTG ADL performance improved to prior level of function (not met)     Plan  Patient would benefit from: skilled physical therapy  Planned therapy interventions: manual therapy, joint mobilization, strengthening, therapeutic activities, therapeutic exercise, stretching, graded activity, home exercise program and functional ROM exercises    Frequency: 2x week  Duration in visits: 12  Duration in weeks: 6  Treatment plan discussed with: patient         Subjective Evaluation     History of Present Illness  Mechanism of injury: Patient reports onset right shoulder pain starting in the fall of 2017   Pain increased and decreased over time and eventually she had an MRI which revealed supraspinatus/infraspinatus and proximal biceps tendinosis with SA bursitis   Recent MRI: no RTC tear      Patient continues to have restricted mobility in all directions which has made it difficult donning and doffing clothes  Pain increases with all right shoulder movement  She had a cortisone injection recently that decreased symptoms mildly  Pain  Current pain ratin  At worst pain ratin  Quality: dull ache  Relieving factors: rest     Patient Goals  Patient goals for therapy: increased strength, independence with ADLs/IADLs, decreased pain and increased motion        Objective      Active Range of Motion   Left Shoulder   Flexion: 170 degrees   Extension: 75 degrees   Abduction: 175 degrees     Right Shoulder   Flexion: 110  (from 121 and 130 degrees)  Extension: 34  (from 46 and 40 degrees)   Abduction: 104 (from 120 and 135 degrees)    Additional Active Range of Motion Details  Reach up low back (C7 to thumb)  9cm left, 38 cm  (from 33cm and 22)     Passive Range of Motion      Right Shoulder  (all movements painful)  Flexion: 135 degrees   Abduction: 125 degrees   External rotation 90°: 53 (from 70 and 90 degrees)   Internal rotation 45°: 35  (from 42 and 35 degrees)            Daily Note      Today's date: 2018  Patient name: Roxie Plascencia  : 1960  MRN: [id number]  Referring provider: Clarisa Black DO  Dx:         Encounter Diagnosis       ICD-10-CM     1  Right shoulder pain, unspecified chronicity M25 511     2  Tendonitis of shoulder, right M75 81     3  Subacromial bursitis of right shoulder joint M75 51     4   Biceps tendinosis of right shoulder M75 21           Precautions: none         Daily Treatment Diary      Manual  10/30             PROM all 15'                                                                                                        Exercise Diary  10/30            pendulums             Table slides flexion :10x10            Table slides scaption :10x10            Table slide ER :10x10            Shoulder flexion, seated with arms on table :10x10            Cane ER stretch with towel under arm  :10x10             pulley's            shld extension on door trim :10x10             pball rolls              shld horiz abd/add pball?                                                                                                                                                                                                                                      Modalities                         MH/CP prn                                                                                Plan: Progress treatment as tolerated

## 2018-11-06 ENCOUNTER — OFFICE VISIT (OUTPATIENT)
Dept: PHYSICAL THERAPY | Facility: CLINIC | Age: 58
End: 2018-11-06
Payer: COMMERCIAL

## 2018-11-06 DIAGNOSIS — G89.29 CHRONIC RIGHT SHOULDER PAIN: ICD-10-CM

## 2018-11-06 DIAGNOSIS — M67.813 BICEPS TENDINOSIS OF RIGHT SHOULDER: Primary | ICD-10-CM

## 2018-11-06 DIAGNOSIS — M77.8 TENDONITIS OF SHOULDER, RIGHT: ICD-10-CM

## 2018-11-06 DIAGNOSIS — M25.511 CHRONIC RIGHT SHOULDER PAIN: ICD-10-CM

## 2018-11-06 PROCEDURE — 97110 THERAPEUTIC EXERCISES: CPT

## 2018-11-06 PROCEDURE — G8991 OTHER PT/OT GOAL STATUS: HCPCS

## 2018-11-06 PROCEDURE — 97140 MANUAL THERAPY 1/> REGIONS: CPT

## 2018-11-06 PROCEDURE — G8990 OTHER PT/OT CURRENT STATUS: HCPCS

## 2018-11-06 NOTE — PROGRESS NOTES
Daily Note     Today's date: 2018  Patient name: Allison Daigle  : 1960  MRN: [id number]  Referring provider: Lorenzo Bess  Dx:   Encounter Diagnosis     ICD-10-CM    1  Biceps tendinosis of right shoulder M67 911    2  Tendonitis of shoulder, right M75 81    3  Chronic right shoulder pain M25 511     G89 29                   Subjective: Patient complains of increased soreness following last session, no change in pain  She has been consistent with HEP 4x/day though notes several stretches cause significant discomfort  Objective: See treatment diary below  Precautions: none          Daily Treatment Diary      Manual  10/30 11/6                   PROM all 15' [facility]                                                                                                                         Exercise Diary  10/30  11/6                   pendulums                       Table slides flexion :10x10 :10x10                   Table slides scaption :10x10 :10x10                   Table slide ER :10x10 :10x10                   Shoulder flexion, seated with arms on table :10x10 :10x10                   Cane ER stretch with towel under arm  :10x10 :10x10                   Supine cane flexion  :10x10           pulley's    4'                   shld extension on door trim :10x10 :10x10                   pball rolls   :05 15x                   shld horiz abd/add pball?   :05 15x                                                                                                                                                                                                                                                                                                                        Modalities                         MH/CP prn                                                                          Assessment: Tolerated treatment fair   Patient would benefit from continued PT      Plan: Progress treatment as tolerated

## 2018-11-13 ENCOUNTER — OFFICE VISIT (OUTPATIENT)
Dept: PHYSICAL THERAPY | Facility: CLINIC | Age: 58
End: 2018-11-13
Payer: COMMERCIAL

## 2018-11-13 DIAGNOSIS — M77.8 TENDONITIS OF SHOULDER, RIGHT: ICD-10-CM

## 2018-11-13 DIAGNOSIS — M67.813 BICEPS TENDINOSIS OF RIGHT SHOULDER: Primary | ICD-10-CM

## 2018-11-13 DIAGNOSIS — G89.29 CHRONIC RIGHT SHOULDER PAIN: ICD-10-CM

## 2018-11-13 DIAGNOSIS — M25.511 CHRONIC RIGHT SHOULDER PAIN: ICD-10-CM

## 2018-11-13 PROCEDURE — 97110 THERAPEUTIC EXERCISES: CPT | Performed by: PHYSICAL THERAPIST

## 2018-11-13 PROCEDURE — 97140 MANUAL THERAPY 1/> REGIONS: CPT | Performed by: PHYSICAL THERAPIST

## 2018-11-13 NOTE — PROGRESS NOTES
Daily Note     Today's date: 2018  Patient name: Bess Orellana  : 1960  MRN: [id number]  Referring provider: Shari Carpenter  Dx:   Encounter Diagnosis     ICD-10-CM    1  Biceps tendinosis of right shoulder M67 911    2  Tendonitis of shoulder, right M75 81    3  Chronic right shoulder pain M25 511     G89 29                   Subjective:  Notes slightly improved donning clothes and stretching into flexion  Stretching into scaption, abduction and ER still painful  Objective: See treatment diary below  Precautions: none          Daily Treatment Diary      Manual  10/30 11/6  11/13                 PROM all 15' [facility]  [place]                                                                                                                       Exercise Diary  10/30  11/6  11/13                 pendulums                       Table slides flexion :10x10 :10x10  :10x10                 Table slides scaption :10x10 :10x10 :10x10                 Table slide ER :10x10 :10x10  :10x10 wedge                 Shoulder flexion, seated with arms on table :10x10 :10x10  :10x10                 Cane ER stretch with towel under arm  :10x10 :10x10  :05x15                 Supine cane flexion  :10x10 :10x10          pulley's    4'  4'                 shld extension on door trim :10x10 :10x10  :10x10                 pball rolls   :05 15x  :05x15x                 shld horiz abd/add pball?   :05 15x  :10x10                                                                                                                                                                                                                                                                                                                      Modalities                         MH/CP prn                                                                          Assessment: Tolerated treatment fair  Patient would benefit from continued PT    PROM has progressed well except for flexion which is unchanged  ER 70, IR 54, abduction 129      Plan: Progress treatment as tolerated

## 2018-11-15 ENCOUNTER — APPOINTMENT (OUTPATIENT)
Dept: PHYSICAL THERAPY | Facility: CLINIC | Age: 58
End: 2018-11-15
Payer: COMMERCIAL

## 2018-11-16 ENCOUNTER — OFFICE VISIT (OUTPATIENT)
Dept: PHYSICAL THERAPY | Facility: CLINIC | Age: 58
End: 2018-11-16
Payer: COMMERCIAL

## 2018-11-16 DIAGNOSIS — M67.813 BICEPS TENDINOSIS OF RIGHT SHOULDER: Primary | ICD-10-CM

## 2018-11-16 DIAGNOSIS — M25.511 CHRONIC RIGHT SHOULDER PAIN: ICD-10-CM

## 2018-11-16 DIAGNOSIS — G89.29 CHRONIC RIGHT SHOULDER PAIN: ICD-10-CM

## 2018-11-16 DIAGNOSIS — M77.8 TENDONITIS OF SHOULDER, RIGHT: ICD-10-CM

## 2018-11-16 PROCEDURE — 97110 THERAPEUTIC EXERCISES: CPT

## 2018-11-16 PROCEDURE — 97140 MANUAL THERAPY 1/> REGIONS: CPT

## 2018-11-16 NOTE — PROGRESS NOTES
Daily Note     Today's date: 2018  Patient name: Jeovany Sanabria  : 1960  MRN: [id number]  Referring provider: Yossi Palafox  Dx:   Encounter Diagnosis     ICD-10-CM    1  Biceps tendinosis of right shoulder M67 911    2  Tendonitis of shoulder, right M75 81    3  Chronic right shoulder pain M25 511     G89 29                   Subjective: Patient reports transient soreness following last session, she has been diligent about stretching at home  Objective: See treatment diary below     Precautions: none          Daily Treatment Diary      Manual  10/30 11/6 11/13 11/16               PROM all 15' [facility]  [place] [facility]                                                                                                                     Exercise Diary  10/30  11/6  11/13  11/16               pendulums                       Table slides flexion :10x10 :10x10  :10x10 :10x10               Table slides scaption :10x10 :10x10 :10x10 :10x10               Table slide ER :10x10 :10x10  :10x10 wedge :10x10 wedge               Shoulder flexion, seated with arms on table :10x10 :10x10  :10x10 :10x10               Cane ER stretch with towel under arm  :10x10 :10x10  :05x15 :05x20               Supine cane flexion   :10x10 :10x10 :10x10               pulley's    4'  4'  4'               shld extension on door trim :10x10 :10x10  :10x10 :10x10               pball rolls   :05 15x  :05x15x :05x20               shld horiz abd/add pball?   :05 15x  :10x10 :10 15x                                                                                                                                                                                                                                                                                                                     Modalities                         MH/CP prn                                                                            Assessment: Tolerated treatment fair  Patient exhibited good technique with therapeutic exercises and would benefit from continued PT      Plan: Progress treatment as tolerated

## 2018-11-19 ENCOUNTER — OFFICE VISIT (OUTPATIENT)
Dept: PHYSICAL THERAPY | Facility: CLINIC | Age: 58
End: 2018-11-19
Payer: COMMERCIAL

## 2018-11-19 DIAGNOSIS — M77.8 TENDONITIS OF SHOULDER, RIGHT: ICD-10-CM

## 2018-11-19 DIAGNOSIS — M67.813 BICEPS TENDINOSIS OF RIGHT SHOULDER: Primary | ICD-10-CM

## 2018-11-19 DIAGNOSIS — M25.511 CHRONIC RIGHT SHOULDER PAIN: ICD-10-CM

## 2018-11-19 DIAGNOSIS — G89.29 CHRONIC RIGHT SHOULDER PAIN: ICD-10-CM

## 2018-11-19 PROCEDURE — 97140 MANUAL THERAPY 1/> REGIONS: CPT | Performed by: PHYSICAL THERAPIST

## 2018-11-19 PROCEDURE — 97110 THERAPEUTIC EXERCISES: CPT | Performed by: PHYSICAL THERAPIST

## 2018-11-19 NOTE — PROGRESS NOTES
Daily Note     Today's date: 2018  Patient name: Misty Lang  : 1960  MRN: [id number]  Referring provider: Merary Carranza  Dx:   Encounter Diagnosis     ICD-10-CM    1  Biceps tendinosis of right shoulder M67 911    2  Tendonitis of shoulder, right M75 81    3   Chronic right shoulder pain M25 511     G89 29                Subjective: notes she is able to get dressed with out pain and notices improve overall mobility; continued pain but slightly less    Objective: See treatment diary below     Precautions: none          Daily Treatment Diary      Manual  10/30 11/6 11/13 11/16  11/19             PROM all 15' [facility]  [place] [facility]  [place]                                                                                                                   Exercise Diary  10/30  11/6  11/13  11/16  11/19             pendulums                       Table slides flexion :10x10 :10x10  :10x10 :10x10  :10x10             Table slides scaption :10x10 :10x10 :10x10 :10x10  :10x10             Table slide ER :10x10 :10x10  :10x10 wedge :10x10 wedge  :10x10 wedge             Shoulder flexion, seated with arms on table :10x10 :10x10  :10x10 :10x10  :10x10             Cane ER stretch with towel under arm  :10x10 :10x10  :05x15 :05x20  :05x20             Supine cane flexion   :10x10 :10x10 :10x10  :10x10             pulley's    4'  4'  4'  4'             shld extension on door trim :10x10 :10x10  :10x10 :10x10  :10x10             pball rolls   :05 15x  :05x15x :05x20 :05x20             shld horiz abd/add pball?   :05 15x  :10x10 :10 15x  :10 15x                                                                                                                                                                                                                                                                                                                   Modalities                         MH/CP prn                                                                            Assessment: Tolerated treatment fair  Patient exhibited good technique with therapeutic exercises and would benefit from continued PT      Plan: Progress treatment as tolerated

## 2018-11-23 ENCOUNTER — APPOINTMENT (OUTPATIENT)
Dept: PHYSICAL THERAPY | Facility: CLINIC | Age: 58
End: 2018-11-23
Payer: COMMERCIAL

## 2018-11-27 ENCOUNTER — OFFICE VISIT (OUTPATIENT)
Dept: PHYSICAL THERAPY | Facility: CLINIC | Age: 58
End: 2018-11-27
Payer: COMMERCIAL

## 2018-11-27 DIAGNOSIS — M77.8 TENDONITIS OF SHOULDER, RIGHT: ICD-10-CM

## 2018-11-27 DIAGNOSIS — M67.813 BICEPS TENDINOSIS OF RIGHT SHOULDER: Primary | ICD-10-CM

## 2018-11-27 DIAGNOSIS — M25.511 CHRONIC RIGHT SHOULDER PAIN: ICD-10-CM

## 2018-11-27 DIAGNOSIS — G89.29 CHRONIC RIGHT SHOULDER PAIN: ICD-10-CM

## 2018-11-27 PROCEDURE — 97110 THERAPEUTIC EXERCISES: CPT

## 2018-11-27 PROCEDURE — 97140 MANUAL THERAPY 1/> REGIONS: CPT

## 2018-11-27 NOTE — PROGRESS NOTES
Daily Note     Today's date: 2018  Patient name: Armond Tavarez  : 1960  MRN: [id number]  Referring provider: Mulu Venegas  Dx:   Encounter Diagnosis     ICD-10-CM    1  Biceps tendinosis of right shoulder M67 911    2  Tendonitis of shoulder, right M75 81    3  Chronic right shoulder pain M25 511     G89 29                   Subjective: Patient reports her pain is slowly lessening since beginning PT, notes she was sore for ~24 hours following last session        Objective: See treatment diary below     Precautions: none          Daily Treatment Diary      Manual  10/30 11/6 11/13 11/16  11/19 11/27           PROM all 15' [facility]  [place] [facility]  SY [facility]                                                                                                                 Exercise Diary  10/30  11/6  11/13  11/16  11/19  11/27           pendulums                       Table slides flexion :10x10 :10x10  :10x10 :10x10  :10x10 :10x10           Table slides scaption :10x10 :10x10 :10x10 :10x10  :10x10 :10x10           Table slide ER :10x10 :10x10  :10x10 wedge :10x10 wedge  :10x10 wedge :10x10 wedge           Shoulder flexion, seated with arms on table :10x10 :10x10  :10x10 :10x10  :10x10 :10x10           Cane ER stretch with towel under arm  :10x10 :10x10  :05x15 :05x20  :05x20 :05x20           Supine cane flexion   :10x10 :10x10 :10x10  :10x10 :10x10           pulley's    4'  4'  4'  4'  4'           shld extension on door trim :10x10 :10x10  :10x10 :10x10  :10x10 :10x10           pball rolls   :05 15x  :05x15x :05x20 :05x20 :05x20           shld horiz abd/add pball?   :05 15x  :10x10 :10 15x  :10 15x :05x20                                                                                                                                                                                                                                                                                                                 Modalities    11/6                    MH/CP prn                                                                           *supervised by NIC LOPEZ for 10 minutes of session*    Assessment: Tolerated treatment well  Better tolerance to manual therapy today though continues to be limited by pain, most restricted into PROM IR  Patient exhibited good technique with therapeutic exercises and would benefit from continued PT      Plan: RE NV

## 2018-11-29 ENCOUNTER — EVALUATION (OUTPATIENT)
Dept: PHYSICAL THERAPY | Facility: CLINIC | Age: 58
End: 2018-11-29
Payer: COMMERCIAL

## 2018-11-29 DIAGNOSIS — G89.29 CHRONIC RIGHT SHOULDER PAIN: ICD-10-CM

## 2018-11-29 DIAGNOSIS — M25.511 CHRONIC RIGHT SHOULDER PAIN: ICD-10-CM

## 2018-11-29 DIAGNOSIS — M67.813 BICEPS TENDINOSIS OF RIGHT SHOULDER: Primary | ICD-10-CM

## 2018-11-29 DIAGNOSIS — M77.8 TENDONITIS OF SHOULDER, RIGHT: ICD-10-CM

## 2018-11-29 PROCEDURE — 97110 THERAPEUTIC EXERCISES: CPT | Performed by: PHYSICAL THERAPIST

## 2018-11-29 PROCEDURE — 97140 MANUAL THERAPY 1/> REGIONS: CPT | Performed by: PHYSICAL THERAPIST

## 2018-11-29 NOTE — PROGRESS NOTES
PT Re-Evaluation      Today's date: 2018  Patient name: Rashida Mccall  YOU: [id number]  Referring provider: Joshua Martinez DO  Dx:             Encounter Diagnosis       ICD-10-CM     1  Right shoulder pain, unspecified chronicity M25 511 Ambulatory referral to Physical Therapy   2  Tendonitis of shoulder, right M75 81 Ambulatory referral to Physical Therapy   3  Subacromial bursitis of right shoulder joint M75 51 Ambulatory referral to Physical Therapy        Assessment  Assessment details: Patient's ROM has progressed very well in all directions and there is improved ability to perform ADLs     Impairments: abnormal or restricted ROM, impaired physical strength, and pain with function    Understanding of Dx/Px/POC: good   Prognosis: fair     Goals  STG Patient is independent with HEP  (met)  STG Increase strength by half grade in 2 weeks (not met)  STG Range of motion is improved by 25% in 4 weeks (not met)  LTG Flexibility is improved in 4 weeks (not met)  LTG ADL performance improved to prior level of function (not met)     Plan  Patient would benefit from: skilled physical therapy  Planned therapy interventions: manual therapy, joint mobilization, strengthening, therapeutic activities, therapeutic exercise, stretching, graded activity, home exercise program and functional ROM exercises    Frequency: 2x week  Duration in visits: 12  Duration in weeks: 6  Treatment plan discussed with: patient         Subjective Evaluation     History of Present Illness  Mechanism of injury: Patient reports onset right shoulder pain starting in the fall of          Patient reports she has an easier time getting dressed and has improved ability to reach overhead  She continues to have pain when reaching into end range, behind the back, across the body to wash           Pain  Current pain ratin  At worst pain ratin  Quality: dull ache  Relieving factors: rest     Patient Goals  Patient goals for therapy: increased strength, independence with ADLs/IADLs, decreased pain and increased motion     Objective      Active Range of Motion   Left Shoulder   Flexion: 170 degrees   Extension: 75 degrees   Abduction: 175 degrees     Right Shoulder   Flexion: 145 (from 110)    Extension: 50 (from 34)   Abduction: 141 (from 104)     Additional Active Range of Motion Details  Reach up low back (C7 to thumb)  9cm left, 28cm (from 38 cm)     Passive Range of Motion      Right Shoulder  (all movements painful)  Flexion:  144 (from 135 degrees)   Abduction: 139 (from 125 degrees)  External rotation 90°: 71  (from 53)   Internal rotation 45°: 61 (from 35)            Daily Note     Today's date: 2018  Patient name: Judie Donaldson  : 1960  MRN: [id number]  Referring provider: Dyan Estrada PA-C  Dx:   Encounter Diagnosis     ICD-10-CM    1  Biceps tendinosis of right shoulder M67 911    2  Tendonitis of shoulder, right M75 81    3   Chronic right shoulder pain M25 511     G89 29                   Precautions: none          Daily Treatment Diary      Manual  10/30 11/6 11/13 11/16  11/19 11/27  11/29         PROM all 15' [facility]  SY [facility]  SY [facility]  SY                                                                                                               Exercise Diary  10/30  11/6  11/13  11/16  11/19  11/27  11/29         pendulums                       Table slides flexion :10x10 :10x10  :10x10 :10x10  :10x10 :10x10           Table slides scaption :10x10 :10x10 :10x10 :10x10  :10x10 :10x10           Table slide ER :10x10 :10x10  :10x10 wedge :10x10 wedge  :10x10 wedge :10x10 wedge  :10x10 wedge         Shoulder flexion, seated with arms on table :10x10 :10x10  :10x10 :10x10  :10x10 :10x10  :10x10         Cane ER stretch with towel under arm  :10x10 :10x10  :05x15 :05x20  :05x20 :05x20  :05x20         Supine cane flexion   :10x10 :10x10 :10x10  :10x10 :10x10  :10x10         pulley's    4'  4'  4'  4'  4'  4'         shld extension on door trim :10x10 :10x10  :10x10 :10x10  :10x10 :10x10  :10x10         pball rolls   :05 15x  :05x15x :05x20 :05x20 :05x20  :05x20         shld horiz abd/add pball?   :05 15x  :10x10 :10 15x  :10 15x :05x20  :05x20          ladder climbs               abduction :10x10

## 2018-12-03 DIAGNOSIS — F41.9 ANXIETY: ICD-10-CM

## 2018-12-03 RX ORDER — ALPRAZOLAM 0.25 MG/1
0.25 TABLET ORAL
Qty: 90 TABLET | Refills: 0 | Status: SHIPPED | OUTPATIENT
Start: 2018-12-03 | End: 2018-12-06 | Stop reason: SDUPTHER

## 2018-12-06 DIAGNOSIS — F41.9 ANXIETY: ICD-10-CM

## 2018-12-06 RX ORDER — ALPRAZOLAM 0.25 MG/1
0.25 TABLET ORAL
Qty: 90 TABLET | Refills: 0 | Status: SHIPPED | OUTPATIENT
Start: 2018-12-06 | End: 2019-03-04 | Stop reason: SDUPTHER

## 2018-12-07 ENCOUNTER — OFFICE VISIT (OUTPATIENT)
Dept: OBGYN CLINIC | Facility: MEDICAL CENTER | Age: 58
End: 2018-12-07
Payer: COMMERCIAL

## 2018-12-07 VITALS
RESPIRATION RATE: 16 BRPM | HEIGHT: 65 IN | HEART RATE: 52 BPM | BODY MASS INDEX: 20.12 KG/M2 | WEIGHT: 120.8 LBS | SYSTOLIC BLOOD PRESSURE: 117 MMHG | DIASTOLIC BLOOD PRESSURE: 74 MMHG

## 2018-12-07 DIAGNOSIS — M75.51 SUBACROMIAL BURSITIS OF RIGHT SHOULDER JOINT: ICD-10-CM

## 2018-12-07 DIAGNOSIS — G89.29 CHRONIC RIGHT SHOULDER PAIN: Primary | ICD-10-CM

## 2018-12-07 DIAGNOSIS — M25.511 CHRONIC RIGHT SHOULDER PAIN: Primary | ICD-10-CM

## 2018-12-07 DIAGNOSIS — M75.01 ADHESIVE CAPSULITIS OF RIGHT SHOULDER: ICD-10-CM

## 2018-12-07 DIAGNOSIS — M77.8 TENDONITIS OF SHOULDER, RIGHT: ICD-10-CM

## 2018-12-07 PROCEDURE — 99213 OFFICE O/P EST LOW 20 MIN: CPT | Performed by: ORTHOPAEDIC SURGERY

## 2018-12-07 NOTE — PROGRESS NOTES
Assessment:  1  Chronic right shoulder pain     2  Tendonitis of shoulder, right     3  Adhesive capsulitis of right shoulder     4  Subacromial bursitis of right shoulder joint         Plan:  The patient presents for improving shoulder pain and diagnosis of adhesive capsulitis  She has found benefit from right shoulder steroid injection and physical therapy  She has improved ROM with no pain an examination  She plans to conclude physical therapy in 3 weeks with one session per week  She is pleased  The patient can follow up as needed  To do next visit:  Return if symptoms worsen or fail to improve  The above stated was discussed in layman's terms and the patient expressed understanding  All questions were answered to the patient's satisfaction  Scribe Attestation    I,:   Anup William am acting as a scribe while in the presence of the attending physician :        I,:   Pradip Zee MD personally performed the services described in this documentation    as scribed in my presence :              Subjective:   Yeison Sigala is a 59 y o  female who presents follow up for right shoulder pain and adhesive capsulitis  She is s/p Right glenohumeral steroid injection, 10/26/18, with lasting benefit  Today she complains of intermittent right anterior shoulder pain with occasional right upper arm pain  She rates her pain at 1/10 and 6-7/10 at its worse  She has had less flares of pain lately  She does find benefit from the physical therapy  She denies medications for her pain  Review of systems negative unless otherwise specified in HPI    Past Medical History:   Diagnosis Date    Fractures        Past Surgical History:   Procedure Laterality Date    BREAST SURGERY Left     Removal of fibroid tumor   Resolved: 1980    CATARACT EXTRACTION      Last assessed: 10/11/16    DIAGNOSTIC LAPAROSCOPY  1982    ENDOMETRIAL BIOPSY  1982    w/o cervical dilation    FL INJECTION RIGHT SHOULDER (ARTHROGRAM)  9/28/2018    TONSILLECTOMY      WISDOM TOOTH EXTRACTION      Resolved: 1977       Family History   Problem Relation Age of Onset    Diabetes Mother         Mellitus    Stroke Mother         CVA    Osteoporosis Mother     Hypertension Mother     Hypertension Father     Diabetes Maternal Grandfather         Mellitus    Heart attack Paternal Grandfather         Myocardial Infarction    Breast cancer Paternal Aunt        Social History     Occupational History    Full-time employment      Social History Main Topics    Smoking status: Never Smoker    Smokeless tobacco: Never Used    Alcohol use Yes      Comment: Acute    Drug use: No    Sexual activity: Not on file         Current Outpatient Prescriptions:     ALPRAZolam (XANAX) 0 25 mg tablet, Take 1 tablet (0 25 mg total) by mouth daily at bedtime as needed for anxiety, Disp: 90 tablet, Rfl: 0    FLUoxetine (PROzac) 20 mg capsule, Take 1 capsule (20 mg total) by mouth daily, Disp: 90 capsule, Rfl: 3    Multiple Vitamins-Minerals (MULTIVITAMIN ADULTS) TABS, Take 1 tablet by mouth daily, Disp: , Rfl:     Allergies   Allergen Reactions    Azithromycin Hives     Reaction Date: 03Aug2011;     Penicillins Rash            Vitals:    12/07/18 1532   BP: 117/74   Pulse: (!) 52   Resp: 16       Objective:          Physical Exam  Physical exam  · General: Awake, Alert, Oriented  · Eyes: Pupils equal, round and reactive to light  · Heart: regular rate and rhythm  · Lungs: No audible wheezing  · Abdomen: soft                    Ortho Exam  Right shoulder:  5/5 ER, IR  No pain with ROM; IR, ER, Abduction or flexion  Negative horn blower's test  Slightly limited ROM of right shoulder compared to left  Non TTP  Sensation intact  Skin warm dry and intact        Diagnostics, reviewed and taken today if performed as documented:    None performed      The attending physician has personally reviewed the pertinent films in PACS and interpretation is as follows:      Procedures, if performed today:    Procedures    None performed      Portions of the record may have been created with voice recognition software   Occasional wrong word or "sound a like" substitutions may have occurred due to the inherent limitations of voice recognition software   Read the chart carefully and recognize, using context, where substitutions have occurred

## 2018-12-11 ENCOUNTER — OFFICE VISIT (OUTPATIENT)
Dept: PHYSICAL THERAPY | Facility: CLINIC | Age: 58
End: 2018-12-11
Payer: COMMERCIAL

## 2018-12-11 DIAGNOSIS — M67.813 BICEPS TENDINOSIS OF RIGHT SHOULDER: Primary | ICD-10-CM

## 2018-12-11 DIAGNOSIS — G89.29 CHRONIC RIGHT SHOULDER PAIN: ICD-10-CM

## 2018-12-11 DIAGNOSIS — M25.511 CHRONIC RIGHT SHOULDER PAIN: ICD-10-CM

## 2018-12-11 DIAGNOSIS — M77.8 TENDONITIS OF SHOULDER, RIGHT: ICD-10-CM

## 2018-12-11 PROCEDURE — G8990 OTHER PT/OT CURRENT STATUS: HCPCS

## 2018-12-11 PROCEDURE — 97110 THERAPEUTIC EXERCISES: CPT

## 2018-12-11 PROCEDURE — G8991 OTHER PT/OT GOAL STATUS: HCPCS

## 2018-12-11 PROCEDURE — 97140 MANUAL THERAPY 1/> REGIONS: CPT

## 2018-12-11 NOTE — PROGRESS NOTES
Daily Note     Today's date: 2018  Patient name: Allison Daigle  : 1960  MRN: [id number]  Referring provider: Lorenzo Bess  Dx:   Encounter Diagnosis     ICD-10-CM    1  Biceps tendinosis of right shoulder M67 911    2  Tendonitis of shoulder, right M75 81    3  Chronic right shoulder pain M25 511     G89 29                   Subjective: Patient saw the MD last week who was satisfied with her progress and DC'd her from his care unless she has an issue (per patient)  She plans on finishing out her last 3 PT sessions before DC to HEP       Objective: See treatment diary below  Precautions: none          Daily Treatment Diary      Manual  10/30 11/6 11/13 11/16  11/19 11/27  11/29  12/11       PROM all 15' [facility]  SY [facility]  SY [facility]  SY [facility]                                                                                                             Exercise Diary  10/30  11/6  11/13  11/16  11/19  11/27  11/29  12/11       pendulums                       Table slides flexion :10x10 :10x10  :10x10 :10x10  :10x10 :10x10           Table slides scaption :10x10 :10x10 :10x10 :10x10  :10x10 :10x10           Table slide ER :10x10 :10x10  :10x10 wedge :10x10 wedge  :10x10 wedge :10x10 wedge  :10x10 wedge :10x10 wedge       Shoulder flexion, seated with arms on table :10x10 :10x10  :10x10 :10x10  :10x10 :10x10  :10x10 :10x10       Cane ER stretch with towel under arm  :10x10 :10x10  :05x15 :05x20  :05x20 :05x20  :05x20 :05x20       Supine cane flexion   :10x10 :10x10 :10x10  :10x10 :10x10  :10x10 :10x10       pulley's    4'  4'  4'  4'  4'  4'  4'       shld extension on door trim :10x10 :10x10  :10x10 :10x10  :10x10 :10x10  :10x10 :10x10       pball rolls   :05 15x  :05x15x :05x20 :05x20 :05x20  :05x20 :05x20       shld horiz abd/add pball?   :05 15x  :10x10 :10 15x  :10 15x :05x20  :05x20 :05x20        ladder climbs              abduction :10x10  abd :10x10                                                                                                                                                                                                                                                                                  *supervised by PT MIRIAM 4:15-4:25*    Assessment: Tolerated treatment well  Continues to have mild discomfort at end range with manual ER stretching, otherwise no pain reported with PROM  Patient would benefit from continued PT      Plan: Progress treatment as tolerated

## 2018-12-18 ENCOUNTER — OFFICE VISIT (OUTPATIENT)
Dept: PHYSICAL THERAPY | Facility: CLINIC | Age: 58
End: 2018-12-18
Payer: COMMERCIAL

## 2018-12-18 DIAGNOSIS — M77.8 TENDONITIS OF SHOULDER, RIGHT: ICD-10-CM

## 2018-12-18 DIAGNOSIS — M25.511 CHRONIC RIGHT SHOULDER PAIN: ICD-10-CM

## 2018-12-18 DIAGNOSIS — G89.29 CHRONIC RIGHT SHOULDER PAIN: ICD-10-CM

## 2018-12-18 DIAGNOSIS — M67.813 BICEPS TENDINOSIS OF RIGHT SHOULDER: Primary | ICD-10-CM

## 2018-12-18 PROCEDURE — 97110 THERAPEUTIC EXERCISES: CPT

## 2018-12-18 PROCEDURE — 97140 MANUAL THERAPY 1/> REGIONS: CPT

## 2018-12-18 NOTE — PROGRESS NOTES
Daily Note     Today's date: 2018  Patient name: Roxie Plascencia  : 1960  MRN: [id number]  Referring provider: Isabella Flores  Dx:   Encounter Diagnosis     ICD-10-CM    1  Biceps tendinosis of right shoulder M67 911    2  Tendonitis of shoulder, right M75 81    3  Chronic right shoulder pain M25 511     G89 29                   Subjective: Patient offers no complaints, she denies soreness following last session      Objective: See treatment diary below    Precautions: none          Daily Treatment Diary      Manual     PROM all  [facility]         [facility]                                                                                                         Exercise Diary     pendulums             Table slides flexion            Table slides scaption            Table slide ER :10x10 wedge       :10x10 wedge   Shoulder flexion, seated with arms on table :10x10       :10x10   Cane ER stretch with towel under arm  :05x20       :05x20   Supine cane flexion :10x10       :10x10   pulley's  4'        4'   shld extension on door trim :10x10       :10x10   pball rolls :05x20       :05x20   shld horiz abd/add pball? :05x20       :05x20    ladder climbs   abd :10x10        abd :10x10                                                                                                                                                                                                                                 Assessment: Tolerated treatment well  Patient had discomfort along superior/lateral shoulder with manual ER and IR stretching though no significant restriction noted  Patient exhibited good technique with therapeutic exercises and would benefit from continued PT      Plan: Potential discharge next visit

## 2018-12-24 ENCOUNTER — EVALUATION (OUTPATIENT)
Dept: PHYSICAL THERAPY | Facility: CLINIC | Age: 58
End: 2018-12-24
Payer: COMMERCIAL

## 2018-12-24 DIAGNOSIS — M25.511 CHRONIC RIGHT SHOULDER PAIN: ICD-10-CM

## 2018-12-24 DIAGNOSIS — M77.8 TENDONITIS OF SHOULDER, RIGHT: ICD-10-CM

## 2018-12-24 DIAGNOSIS — G89.29 CHRONIC RIGHT SHOULDER PAIN: ICD-10-CM

## 2018-12-24 DIAGNOSIS — M67.813 BICEPS TENDINOSIS OF RIGHT SHOULDER: Primary | ICD-10-CM

## 2018-12-24 PROCEDURE — 97110 THERAPEUTIC EXERCISES: CPT | Performed by: PHYSICAL THERAPIST

## 2018-12-24 PROCEDURE — 97140 MANUAL THERAPY 1/> REGIONS: CPT | Performed by: PHYSICAL THERAPIST

## 2018-12-24 NOTE — PROGRESS NOTES
PT Re-Evaluation      Today's date: 2018  Patient name: Patricia L Maralyn Nageotte  DMF: [id number]  Referring provider: Sylvia Martinez DO  Dx:             Encounter Diagnosis       ICD-10-CM     1  Right shoulder pain, unspecified chronicity M25 511 Ambulatory referral to Physical Therapy   2  Tendonitis of shoulder, right M75 81 Ambulatory referral to Physical Therapy   3  Subacromial bursitis of right shoulder joint M75 51 Ambulatory referral to Physical Therapy         Assessment  Assessment details: Patient's ROM is now full except for IR  She has no pain with ADLs         Impairments: abnormal or restricted ROM, impaired physical strength, and pain with function    Understanding of Dx/Px/POC: good   Prognosis: fair     Goals  STG Patient is independent with HEP  (met)  STG Increase strength by half grade in 2 weeks (met)  STG Range of motion is improved by 25% in 4 weeks (met)  LTG Flexibility is improved in 4 weeks (met)  LTG ADL performance improved to prior level of function (met)     Plan  Patient would benefit from: skilled physical therapy  Planned therapy interventions: manual therapy, joint mobilization, strengthening, therapeutic activities, therapeutic exercise, stretching, graded activity, home exercise program and functional ROM exercises    Frequency: 2x week  Duration in visits: 6  Duration in weeks: 3  Treatment plan discussed with: patient         Subjective Evaluation     History of Present Illness  Mechanism of injury: Patient reports onset right shoulder pain starting in the fall of           Patient reports she has no pain with ADLs and only has difficulty reaching across the body   She is very pleased with her progress       Pain  Current pain ratin  At worst pain ratin (while stretching)  Quality: dull ache  Relieving factors: rest     Patient Goals  Patient goals for therapy: increased strength, independence with ADLs/IADLs, decreased pain and increased motion     Objective      Active Range of Motion   Left Shoulder   Flexion: 170 degrees   Extension: 75 degrees   Abduction: 175 degrees     Right Shoulder   Flexion: 165 (from 110 and 145)    Extension: 50 (from 34)   Abduction: 168 (from 104 and 141)     Additional Active Range of Motion Details  Reach up low back (C7 to thumb)  9cm left, 28cm (from 38 cm)     Passive Range of Motion      Right Shoulder  (all movements painful)  Flexion:  166 (from 135 degrees)   Abduction: 170 (from 125 degrees)  External rotation 90°: 90 (from 53)   Internal rotation 45°: 61 (from 35)            Daily Note     Today's date: 2018  Patient name: Issac Cancino  : 1960  MRN: [id number]  Referring provider: Franci Power PA-C  Dx:   No diagnosis found                Precautions: none          Daily Treatment Diary      Manual     PROM all  [facility] SY        [address]                                                                                                         Exercise Diary     pendulums             Table slides flexion            Table slides scaption            Table slide ER :10x10 wedge :10x10 wedge      :10x10 wedge   Shoulder flexion, seated with arms on table :10x10 :10x10      :10x10   Cane ER stretch with towel under arm  :05x20 :05x20      :05x20   Supine cane flexion :10x10 :10x10      :10x10   pulley's  4' 4'       4'   shld extension on door trim :10x10 :10x10      :10x10   pball rolls :05x20 :05x20      :05x20   shld horiz abd/add pball? :05x20 :05x20      :05x20    ladder climbs   abd :10x10 abd :10x10       abd :10x10    cross body stretch, holding on to object   :20x4

## 2019-03-04 DIAGNOSIS — F41.9 ANXIETY: ICD-10-CM

## 2019-03-04 RX ORDER — ALPRAZOLAM 0.25 MG/1
0.25 TABLET ORAL
Qty: 90 TABLET | Refills: 0 | Status: SHIPPED | OUTPATIENT
Start: 2019-03-04 | End: 2019-05-29 | Stop reason: SDUPTHER

## 2019-04-02 LAB — HBA1C MFR BLD HPLC: 5.5 %

## 2019-05-29 DIAGNOSIS — F41.9 ANXIETY: ICD-10-CM

## 2019-05-29 RX ORDER — ALPRAZOLAM 0.25 MG/1
0.25 TABLET ORAL
Qty: 90 TABLET | Refills: 0 | Status: SHIPPED | OUTPATIENT
Start: 2019-05-29 | End: 2019-09-01 | Stop reason: SDUPTHER

## 2019-09-01 DIAGNOSIS — F41.9 ANXIETY: ICD-10-CM

## 2019-09-01 RX ORDER — FLUOXETINE HYDROCHLORIDE 20 MG/1
CAPSULE ORAL
Qty: 90 CAPSULE | Refills: 3 | Status: SHIPPED | OUTPATIENT
Start: 2019-09-01 | End: 2020-11-09 | Stop reason: SDUPTHER

## 2019-09-03 RX ORDER — ALPRAZOLAM 0.25 MG/1
TABLET ORAL
Qty: 90 TABLET | Refills: 0 | Status: SHIPPED | OUTPATIENT
Start: 2019-09-03 | End: 2019-11-27 | Stop reason: SDUPTHER

## 2019-11-27 ENCOUNTER — TELEPHONE (OUTPATIENT)
Dept: FAMILY MEDICINE CLINIC | Facility: CLINIC | Age: 59
End: 2019-11-27

## 2019-11-27 DIAGNOSIS — F41.9 ANXIETY: ICD-10-CM

## 2019-11-27 RX ORDER — ALPRAZOLAM 0.25 MG/1
0.25 TABLET ORAL 2 TIMES DAILY PRN
Qty: 90 TABLET | Refills: 0 | Status: SHIPPED | OUTPATIENT
Start: 2019-11-27 | End: 2020-02-26 | Stop reason: SDUPTHER

## 2019-11-27 NOTE — TELEPHONE ENCOUNTER
Left message on script line to refill Alprazolam  25 mg tablet takes one at bedtime prn anxiety to [facility]

## 2020-02-25 DIAGNOSIS — F41.9 ANXIETY: ICD-10-CM

## 2020-02-25 NOTE — TELEPHONE ENCOUNTER
Patient req refill sent to [facility]     Alprazolam 0 25 mg   # 90  1 tablet by mouth twice daily     Last OV 05/08/2018, Next OV 03/26/20

## 2020-02-26 RX ORDER — ALPRAZOLAM 0.25 MG/1
0.25 TABLET ORAL 2 TIMES DAILY PRN
Qty: 90 TABLET | Refills: 0 | Status: SHIPPED | OUTPATIENT
Start: 2020-02-26 | End: 2020-05-27 | Stop reason: SDUPTHER

## 2020-03-26 ENCOUNTER — OFFICE VISIT (OUTPATIENT)
Dept: FAMILY MEDICINE CLINIC | Facility: CLINIC | Age: 60
End: 2020-03-26
Payer: COMMERCIAL

## 2020-03-26 VITALS
DIASTOLIC BLOOD PRESSURE: 74 MMHG | TEMPERATURE: 98 F | HEIGHT: 65 IN | HEART RATE: 72 BPM | SYSTOLIC BLOOD PRESSURE: 112 MMHG | WEIGHT: 126 LBS | BODY MASS INDEX: 20.99 KG/M2

## 2020-03-26 DIAGNOSIS — Z00.00 ANNUAL PHYSICAL EXAM: Primary | ICD-10-CM

## 2020-03-26 DIAGNOSIS — Z12.11 COLON CANCER SCREENING: ICD-10-CM

## 2020-03-26 PROCEDURE — 99396 PREV VISIT EST AGE 40-64: CPT | Performed by: FAMILY MEDICINE

## 2020-03-26 NOTE — PATIENT INSTRUCTIONS

## 2020-03-26 NOTE — PROGRESS NOTES
[address]    NAME: Felipe Amador  AGE: 62 y o  SEX: female  : 1960     DATE: 3/26/2020     Assessment and Plan:     Problem List Items Addressed This Visit     None          Immunizations and preventive care screenings were discussed with patient today  Appropriate education was printed on patient's after visit summary  Counseling:  · Exercise: the importance of regular exercise/physical activity was discussed  Recommend exercise 3-5 times per week for at least 30 minutes  No follow-ups on file  Chief Complaint:     Chief Complaint   Patient presents with    Annual Exam      History of Present Illness:     Adult Annual Physical   Patient here for a comprehensive physical exam  The patient reports no problems  Diet and Physical Activity  · Diet/Nutrition: well balanced diet  · Exercise: moderate cardiovascular exercise  Depression Screening  PHQ-9 Depression Screening    PHQ-9:    Frequency of the following problems over the past two weeks:       Little interest or pleasure in doing things:  0 - not at all  Feeling down, depressed, or hopeless:  0 - not at all  PHQ-2 Score:  0       General Health  · Sleep: sleeps well  · Hearing: normal - bilateral   · Vision: no vision problems  · Dental: regular dental visits  /GYN Health  · Patient is: postmenopausal  · Last menstrual period:   · Contraceptive method:       Review of Systems:     Review of Systems   All other systems reviewed and are negative  Past Medical History:     Past Medical History:   Diagnosis Date    Fractures       Past Surgical History:     Past Surgical History:   Procedure Laterality Date    BREAST SURGERY Left     Removal of fibroid tumor   Resolved:     CATARACT EXTRACTION      Last assessed: 10/11/16    DIAGNOSTIC LAPAROSCOPY      ENDOMETRIAL BIOPSY      w/o cervical dilation    FL INJECTION RIGHT SHOULDER (ARTHROGRAM)  9/28/2018    TONSILLECTOMY      WISDOM TOOTH EXTRACTION      Resolved: 1977      Social History:        Social History     Socioeconomic History    Marital status: /Civil Union     Spouse name: None    Number of children: 1    Years of education: None    Highest education level: None   Occupational History    Occupation: Full-time employment   Social Needs    Financial resource strain: None    Food insecurity:     Worry: None     Inability: None    Transportation needs:     Medical: None     Non-medical: None   Tobacco Use    Smoking status: Never Smoker    Smokeless tobacco: Never Used   Substance and Sexual Activity    Alcohol use: Yes     Comment: Acute    Drug use: No    Sexual activity: None   Lifestyle    Physical activity:     Days per week: None     Minutes per session: None    Stress: None   Relationships    Social connections:     Talks on phone: None     Gets together: None     Attends Jainism service: None     Active member of club or organization: None     Attends meetings of clubs or organizations: None     Relationship status: None    Intimate partner violence:     Fear of current or ex partner: None     Emotionally abused: None     Physically abused: None     Forced sexual activity: None   Other Topics Concern    None   Social History Narrative    Daily caffeine consumption: 2-3 servings a day    Exercises 1 to 2 times per week      Family History:     Family History   Problem Relation Age of Onset    Diabetes Mother         Mellitus    Stroke Mother         CVA    Osteoporosis Mother     Hypertension Mother     Hypertension Father     Diabetes Maternal Grandfather         Mellitus    Heart attack Paternal Grandfather         Myocardial Infarction    Breast cancer Paternal Aunt       Current Medications:     Current Outpatient Medications   Medication Sig Dispense Refill    ALPRAZolam (XANAX) 0 25 mg tablet Take 1 tablet (0 25 mg total) by mouth 2 (two) times a day as needed for anxiety 90 tablet 0    FLUoxetine (PROzac) 20 mg capsule take 1 capsule by mouth once daily 90 capsule 3    Multiple Vitamins-Minerals (MULTIVITAMIN ADULTS) TABS Take 1 tablet by mouth daily       No current facility-administered medications for this visit  Allergies: Allergies   Allergen Reactions    Azithromycin Hives     Reaction Date: 03Aug2011;     Penicillins Rash      Physical Exam:     /74   Pulse 72   Temp 98 °F (36 7 °C)   Ht 5' 5" (1 651 m)   Wt 57 2 kg (126 lb)   BMI 20 97 kg/m²     Physical Exam   Constitutional: She appears well-developed and well-nourished  No distress  HENT:   Head: Normocephalic and atraumatic  Right Ear: External ear normal    Left Ear: External ear normal    Nose: Nose normal    Mouth/Throat: Oropharynx is clear and moist    Eyes: Pupils are equal, round, and reactive to light  Conjunctivae and EOM are normal  Right eye exhibits no discharge  Left eye exhibits no discharge  Neck: Normal range of motion  Neck supple  No thyromegaly present  Cardiovascular: Normal rate, regular rhythm, normal heart sounds and intact distal pulses  No murmur heard  Pulmonary/Chest: Effort normal and breath sounds normal  She has no wheezes  Abdominal: Soft  Bowel sounds are normal  She exhibits no distension and no mass  Musculoskeletal: Normal range of motion  She exhibits no edema, tenderness or deformity  Lymphadenopathy:     She has no cervical adenopathy  Neurological: She is alert  She displays normal reflexes  No cranial nerve deficit or sensory deficit  She exhibits normal muscle tone  Coordination normal    Skin: Skin is warm and dry  Capillary refill takes less than 2 seconds  No erythema  Psychiatric: She has a normal mood and affect   Her behavior is normal  Judgment and thought content normal        Leandra Munoz, [id number]

## 2020-05-27 DIAGNOSIS — F41.9 ANXIETY: ICD-10-CM

## 2020-05-27 RX ORDER — ALPRAZOLAM 0.25 MG/1
0.25 TABLET ORAL 2 TIMES DAILY PRN
Qty: 90 TABLET | Refills: 0 | Status: SHIPPED | OUTPATIENT
Start: 2020-05-27 | End: 2020-08-26 | Stop reason: SDUPTHER

## 2020-06-08 ENCOUNTER — ANNUAL EXAM (OUTPATIENT)
Dept: GYNECOLOGY | Facility: CLINIC | Age: 60
End: 2020-06-08
Payer: COMMERCIAL

## 2020-06-08 VITALS
BODY MASS INDEX: 20.83 KG/M2 | SYSTOLIC BLOOD PRESSURE: 106 MMHG | DIASTOLIC BLOOD PRESSURE: 58 MMHG | HEIGHT: 65 IN | WEIGHT: 125 LBS

## 2020-06-08 DIAGNOSIS — Z01.419 ENCOUNTER FOR ANNUAL ROUTINE GYNECOLOGICAL EXAMINATION: Primary | ICD-10-CM

## 2020-06-08 DIAGNOSIS — Z12.31 ENCOUNTER FOR SCREENING MAMMOGRAM FOR BREAST CANCER: ICD-10-CM

## 2020-06-08 PROCEDURE — S0612 ANNUAL GYNECOLOGICAL EXAMINA: HCPCS | Performed by: OBSTETRICS & GYNECOLOGY

## 2020-06-08 PROCEDURE — 3008F BODY MASS INDEX DOCD: CPT | Performed by: OBSTETRICS & GYNECOLOGY

## 2020-06-08 PROCEDURE — 3008F BODY MASS INDEX DOCD: CPT | Performed by: FAMILY MEDICINE

## 2020-07-19 ENCOUNTER — APPOINTMENT (EMERGENCY)
Dept: RADIOLOGY | Facility: HOSPITAL | Age: 60
End: 2020-07-19
Payer: COMMERCIAL

## 2020-07-19 ENCOUNTER — HOSPITAL ENCOUNTER (EMERGENCY)
Facility: HOSPITAL | Age: 60
Discharge: HOME | End: 2020-07-19
Attending: EMERGENCY MEDICINE | Admitting: EMERGENCY MEDICINE
Payer: COMMERCIAL

## 2020-07-19 VITALS
SYSTOLIC BLOOD PRESSURE: 123 MMHG | OXYGEN SATURATION: 98 % | HEIGHT: 66 IN | HEART RATE: 68 BPM | TEMPERATURE: 98.5 F | BODY MASS INDEX: 21.9 KG/M2 | RESPIRATION RATE: 16 BRPM | WEIGHT: 136.24 LBS | DIASTOLIC BLOOD PRESSURE: 60 MMHG

## 2020-07-19 DIAGNOSIS — S93.409A ANKLE SPRAIN: Primary | ICD-10-CM

## 2020-07-19 PROCEDURE — 99283 EMERGENCY DEPT VISIT LOW MDM: CPT

## 2020-07-19 PROCEDURE — 99284 EMERGENCY DEPT VISIT MOD MDM: CPT | Performed by: EMERGENCY MEDICINE

## 2020-07-19 PROCEDURE — 73610 X-RAY EXAM OF ANKLE: CPT

## 2020-07-19 PROCEDURE — 73630 X-RAY EXAM OF FOOT: CPT

## 2020-07-19 RX ORDER — OXYCODONE HYDROCHLORIDE 5 MG/1
5 TABLET ORAL EVERY 6 HOURS PRN
Qty: 5 TABLET | Refills: 0 | Status: SHIPPED | OUTPATIENT
Start: 2020-07-19 | End: 2020-07-22

## 2020-07-19 RX ORDER — ACETAMINOPHEN 325 MG/1
650 TABLET ORAL ONCE
Status: COMPLETED | OUTPATIENT
Start: 2020-07-19 | End: 2020-07-19

## 2020-07-19 RX ADMIN — ACETAMINOPHEN 650 MG: 325 TABLET, FILM COATED ORAL at 14:28

## 2020-07-19 NOTE — ED PROVIDER NOTES
History  Chief Complaint   Patient presents with    Foot Injury     Pt presents to the ED after missing the last step on the stairs, twisted her foot  Pt's right foot is swollen, bruised  HPI     66-year-old female presenting for evaluation of bruising and swelling associated with pain lateral dorsal aspect of her right foot  The patient was going down a flight of stairs when she missed the last step and inverted her ankle  She fell to the ground, denies hitting her head or loss of consciousness  Denies any other area of pain  She is not on blood thinners  Reports hearing a crack when the incident happened  No history of injury to the area  Prior to Admission Medications   Prescriptions Last Dose Informant Patient Reported? Taking? ALPRAZolam (XANAX) 0 25 mg tablet   No No   Sig: Take 1 tablet (0 25 mg total) by mouth 2 (two) times a day as needed for anxiety   FLUoxetine (PROzac) 20 mg capsule  Self No No   Sig: take 1 capsule by mouth once daily   Multiple Vitamins-Minerals (MULTIVITAMIN ADULTS) TABS  Self Yes No   Sig: Take 1 tablet by mouth daily      Facility-Administered Medications: None       Past Medical History:   Diagnosis Date    Fractures        Past Surgical History:   Procedure Laterality Date    BREAST SURGERY Left     Removal of fibroid tumor   Resolved: 1980    CATARACT EXTRACTION      Last assessed: 10/11/16    DIAGNOSTIC LAPAROSCOPY  1982    ENDOMETRIAL BIOPSY  1982    w/o cervical dilation    FL INJECTION RIGHT SHOULDER (ARTHROGRAM)  9/28/2018    TONSILLECTOMY      WISDOM TOOTH EXTRACTION      Resolved: 1977       Family History   Problem Relation Age of Onset    Diabetes Mother         Mellitus    Stroke Mother         CVA    Osteoporosis Mother     Hypertension Mother     Hypertension Father     Diabetes Maternal Grandfather         Mellitus    Heart attack Paternal Grandfather         Myocardial Infarction    Breast cancer Paternal Aunt      I have reviewed and agree with the history as documented  E-Cigarette/Vaping    E-Cigarette Use Never User      E-Cigarette/Vaping Substances    Nicotine No     THC No     CBD No     Flavoring No     Other No     Unknown No      Social History     Tobacco Use    Smoking status: Never Smoker    Smokeless tobacco: Never Used   Substance Use Topics    Alcohol use: Yes     Comment: Acute    Drug use: No       Review of Systems   Musculoskeletal: Positive for arthralgias (R foot/ankle) and joint swelling (R foot/ankle)  Negative for back pain and neck pain  Neurological: Negative for syncope and headaches  Physical Exam  Physical Exam   Constitutional: She appears well-developed and well-nourished  No distress  HENT:   Head: Normocephalic and atraumatic  Eyes: Conjunctivae are normal    Neck: Normal range of motion  Cardiovascular: Normal rate and intact distal pulses  Pulmonary/Chest: Effort normal  No respiratory distress  Abdominal: She exhibits no distension  Musculoskeletal:   Swelling over the proximal, lateral, dorsal aspect the right foot with associated ecchymosis  Range of motion of the ankle is intact but limited by pain  No tenderness to palpation over the medial or lateral malleoli  Neurological:   Intact sensation to light touch in the peripheral nerve distributions of the right foot with full range of motion of the toes   Skin: She is not diaphoretic         Vital Signs  ED Triage Vitals   Temperature Pulse Respirations Blood Pressure SpO2   07/19/20 1359 07/19/20 1357 07/19/20 1357 07/19/20 1359 07/19/20 1357   98 5 °F (36 9 °C) 68 16 123/60 98 %      Temp Source Heart Rate Source Patient Position - Orthostatic VS BP Location FiO2 (%)   07/19/20 1359 07/19/20 1357 -- 07/19/20 1357 --   Oral Monitor  Right arm       Pain Score       07/19/20 1357       6           Vitals:    07/19/20 1357 07/19/20 1359   BP:  123/60   Pulse: 68          Visual Acuity      ED Medications  Medications acetaminophen (TYLENOL) tablet 650 mg (650 mg Oral Given 7/19/20 6629)       Diagnostic Studies  Results Reviewed     None                 XR foot 3+ views RIGHT    (Results Pending)   XR ankle 3+ views RIGHT    (Results Pending)              Procedures  Procedures         ED Course                                             MDM  Number of Diagnoses or Management Options  Ankle sprain: new and requires workup  Diagnosis management comments: Patient has a hematoma with some swelling and tenderness over the dorsal, lateral, proximal aspect of the right foot  She is able to move her ankle but with significant discomfort  No pain over the medial or lateral malleoli  X-rays with no acute fracture malalignment on my read  Suspect ankle sprain  Will place an air stirrup splint, and provide crutches as the patient is having difficulty bearing weight  Recommend progressing to weight-bearing as tolerated  Phone number given for Orthopedics if she continues to have difficulty bearing weight in 1 week  Recommend rest, ice, elevation, and NSAIDs  Return precautions discussed, and patient discharged in good condition  Amount and/or Complexity of Data Reviewed  Tests in the radiology section of CPT®: ordered and reviewed  Independent visualization of images, tracings, or specimens: yes    Patient Progress  Patient progress: stable        Disposition  Final diagnoses: Ankle sprain     Time reflects when diagnosis was documented in both MDM as applicable and the Disposition within this note     Time User Action Codes Description Comment    7/19/2020  3:19 PM Jacey Hernadez Add [W94 881L] Ankle sprain       ED Disposition     ED Disposition Condition Date/Time Comment    Discharge Stable Sun Jul 19, 2020  3:19 PM Bess Orellana discharge to home/self care              Follow-up Information     Follow up With Specialties Details Why Contact Info Additional 1975 Nolvia Douglass MD Orthopedic Surgery Call in 1 week If you continue to have pain with bearing weight on your right ankle  [address] [facility] [address] [facility] Emergency Department Emergency Medicine  As we discussed, return to the Emergency Department immediately for redness to your ankle with inability to move it, or fever  [address] [facility] [address] [phone] [facility] ED, [address], [place], [place], [address]          Discharge Medication List as of 7/19/2020  3:20 PM      CONTINUE these medications which have NOT CHANGED    Details   ALPRAZolam (XANAX) 0 25 mg tablet Take 1 tablet (0 25 mg total) by mouth 2 (two) times a day as needed for anxiety, Starting Wed 5/27/2020, Normal      FLUoxetine (PROzac) 20 mg capsule take 1 capsule by mouth once daily, Normal      Multiple Vitamins-Minerals (MULTIVITAMIN ADULTS) TABS Take 1 tablet by mouth daily, Historical Med           No discharge procedures on file      PDMP Review       Value Time User    PDMP Reviewed  Yes 5/27/2020  1:47 PM Trisha Grubbs          ED Provider  Electronically Signed by           Jame Elizabeth MD  07/19/20 2453

## 2020-07-21 ENCOUNTER — VBI (OUTPATIENT)
Dept: ADMINISTRATIVE | Facility: OTHER | Age: 60
End: 2020-07-21

## 2020-07-21 NOTE — TELEPHONE ENCOUNTER
Nenita Conway    ED Visit Information     Ed visit date: 7/19/20  Diagnosis Description: Ankle sprain   In Network? Yes [address] [facility]  Discharge status: Home  Discharged with meds ? Yes  Number of ED visits to date: 1  ED Severity:4     Outreach Information    Outreach successful: Yes 1  Date letter mailed:0  Date Finalized:7/21/20    Care Coordination    Follow up appointment with pcp: no declined she wants to wait  Transportation issues ? No    Value [employer] type:  3 Day Outreach  Emergent necessity warranted by diagnosis:  No  [facility] PCP:  Yes  Transportation:  Friend/Family Transport  Called PCP first?:  No  Feels able to call PCP for urgent problems ?:  No  Understands what emergencies can be handled by PCP ?:  Yes  Ever any problems getting appointment with PCP for minor emergency/urgency problems?:  No  Practice Contacted Patient ?:  No  Pt had ED follow up with pcp/staff ?:  No    Reason Patient went to ED instead of Urgent Care or PCP?:  Perceived Severity of Illness  Urgent care Education?:  No  7/21/20-There was a Personal communication with Patient regarding recent ED visit on 7/19/20  Patient stated that she is doing ok, Patient declined for now a follow up with PCP  Patient was not aware of PCP after hour's on-call service, Patient is aware of her nearest [place] [address] urgent care facility, education given about on call hrs   Patient does not meet OPCM criteria

## 2020-08-26 DIAGNOSIS — F41.9 ANXIETY: ICD-10-CM

## 2020-08-26 RX ORDER — ALPRAZOLAM 0.25 MG/1
0.25 TABLET ORAL 2 TIMES DAILY PRN
Qty: 180 TABLET | Refills: 0 | Status: SHIPPED | OUTPATIENT
Start: 2020-08-26 | End: 2020-08-28 | Stop reason: SDUPTHER

## 2020-08-26 NOTE — TELEPHONE ENCOUNTER
Per PDMP last fill 05/27/20   Last OV 03/26/20 05/27/2020 1 05/27/2020 ALPRAZOLAM 0 25 MG TABLET 90 0 45 CA BRO [id number] [facility] ([id number]) 0 Comm Ins [place]

## 2020-08-28 ENCOUNTER — TELEPHONE (OUTPATIENT)
Dept: FAMILY MEDICINE CLINIC | Facility: CLINIC | Age: 60
End: 2020-08-28

## 2020-08-28 DIAGNOSIS — F41.9 ANXIETY: ICD-10-CM

## 2020-08-28 RX ORDER — ALPRAZOLAM 0.25 MG/1
0.25 TABLET ORAL 2 TIMES DAILY PRN
Qty: 180 TABLET | Refills: 0 | Status: SHIPPED | OUTPATIENT
Start: 2020-08-28 | End: 2021-02-22 | Stop reason: SDUPTHER

## 2020-08-28 NOTE — TELEPHONE ENCOUNTER
Alprazolam was sent to [facility] and was suppose to be sent to [facility]   Please re send

## 2020-09-11 ENCOUNTER — HOSPITAL ENCOUNTER (OUTPATIENT)
Dept: RADIOLOGY | Facility: MEDICAL CENTER | Age: 60
Discharge: HOME | End: 2020-09-11
Payer: COMMERCIAL

## 2020-09-11 VITALS — WEIGHT: 279.32 LBS | HEIGHT: 65 IN | BODY MASS INDEX: 46.54 KG/M2

## 2020-09-11 DIAGNOSIS — Z12.31 ENCOUNTER FOR SCREENING MAMMOGRAM FOR BREAST CANCER: ICD-10-CM

## 2020-09-11 PROCEDURE — 77067 SCR MAMMO BI INCL CAD: CPT

## 2020-09-11 PROCEDURE — 77063 BREAST TOMOSYNTHESIS BI: CPT

## 2020-09-13 DIAGNOSIS — Z91.89 INCREASED RISK OF BREAST CANCER: Primary | ICD-10-CM

## 2020-09-13 NOTE — RESULT ENCOUNTER NOTE
Mammogram is normal  However, the cancer screening tool suggests your lifetime risk is  21 26 % of developing Breast Cancer  This has not been done before at [facility]  In these cases we recommend imaging twice yearly- Annual Mammograms and Annual Breast MRIs which should be 6 months apart  An order was placed for the MRI in 6 months  Please contact your insurance company for coverage of this service

## 2020-09-18 ENCOUNTER — TELEPHONE (OUTPATIENT)
Dept: OBGYN CLINIC | Facility: CLINIC | Age: 60
End: 2020-09-18

## 2020-09-18 NOTE — TELEPHONE ENCOUNTER
Patient called stating that her breast MRI scheduled on 3/18/2021 does need a prior authorization, as informed by her insurance company  Please advise

## 2020-10-05 ENCOUNTER — LAB REQUISITION (OUTPATIENT)
Dept: LAB | Facility: HOSPITAL | Age: 60
End: 2020-10-05
Payer: COMMERCIAL

## 2020-10-05 DIAGNOSIS — R19.5 OTHER FECAL ABNORMALITIES: ICD-10-CM

## 2020-10-05 DIAGNOSIS — K63.5 POLYP OF COLON: ICD-10-CM

## 2020-10-05 DIAGNOSIS — Z83.71 FAMILY HISTORY OF COLONIC POLYPS: ICD-10-CM

## 2020-10-05 PROCEDURE — 88305 TISSUE EXAM BY PATHOLOGIST: CPT | Performed by: PATHOLOGY

## 2020-10-19 ENCOUNTER — TRANSCRIBE ORDERS (OUTPATIENT)
Dept: ADMINISTRATIVE | Facility: HOSPITAL | Age: 60
End: 2020-10-19

## 2020-10-19 DIAGNOSIS — R52 PAIN: Primary | ICD-10-CM

## 2020-11-04 LAB — HBA1C MFR BLD HPLC: 5.7 %

## 2020-11-09 DIAGNOSIS — F41.9 ANXIETY: ICD-10-CM

## 2020-11-09 RX ORDER — FLUOXETINE HYDROCHLORIDE 20 MG/1
20 CAPSULE ORAL DAILY
Qty: 90 CAPSULE | Refills: 3 | Status: SHIPPED | OUTPATIENT
Start: 2020-11-09 | End: 2021-06-02 | Stop reason: SDUPTHER

## 2020-11-10 ENCOUNTER — HOSPITAL ENCOUNTER (OUTPATIENT)
Dept: MRI IMAGING | Facility: HOSPITAL | Age: 60
Discharge: HOME | End: 2020-11-10

## 2021-01-11 ENCOUNTER — HOSPITAL ENCOUNTER (OUTPATIENT)
Dept: RADIOLOGY | Facility: IMAGING CENTER | Age: 61
Discharge: HOME | End: 2021-01-11
Payer: COMMERCIAL

## 2021-01-11 DIAGNOSIS — R52 PAIN: ICD-10-CM

## 2021-01-11 PROCEDURE — G1004 CDSM NDSC: HCPCS

## 2021-01-11 PROCEDURE — A9585 GADOBUTROL INJECTION: HCPCS | Performed by: RADIOLOGY

## 2021-01-11 PROCEDURE — 73720 MRI LWR EXTREMITY W/O&W/DYE: CPT

## 2021-01-11 RX ADMIN — GADOBUTROL 5 ML: 604.72 INJECTION INTRAVENOUS at 11:34

## 2021-02-22 ENCOUNTER — TELEPHONE (OUTPATIENT)
Dept: OBGYN CLINIC | Facility: CLINIC | Age: 61
End: 2021-02-22

## 2021-02-22 DIAGNOSIS — F41.9 ANXIETY: ICD-10-CM

## 2021-02-22 RX ORDER — ALPRAZOLAM 0.25 MG/1
0.25 TABLET ORAL 2 TIMES DAILY PRN
Qty: 180 TABLET | Refills: 0 | Status: SHIPPED | OUTPATIENT
Start: 2021-02-22 | End: 2021-08-19 | Stop reason: SDUPTHER

## 2021-02-22 NOTE — TELEPHONE ENCOUNTER
Pt last seen 6/2020 with Dr Melissa Amezquita,  She has a breast MRI sched 3/18 & needs authorization,   pls contact her [employer] company to get prior Yuval [place], Thanks

## 2021-02-22 NOTE — TELEPHONE ENCOUNTER
Per PDMP last fill 08/28/20  Last OV 03/26/20 08/28/2020 1 08/28/2020   ALPRAZOLAM 0 25 MG TABLET  180 0 90 CA BRO [id number] [facility] (0004) 0  Comm Ins [place]    07/19/2020 1 07/19/2020   OXYCODONE HCL 5 MG TABLET  5 0 2 JE IRI [id number] [facility] ([id number]) 0 18 75 MME Comm Ins [place]    05/27/2020 1 05/27/2020   ALPRAZOLAM 0 25 MG TABLET  90 0 45 CA BRO [id number] [facility] ([id number]) 0  Comm Ins [place]

## 2021-03-18 ENCOUNTER — HOSPITAL ENCOUNTER (OUTPATIENT)
Dept: RADIOLOGY | Facility: HOSPITAL | Age: 61
Discharge: HOME | End: 2021-03-18
Attending: OBSTETRICS & GYNECOLOGY
Payer: COMMERCIAL

## 2021-03-18 DIAGNOSIS — Z91.89 INCREASED RISK OF BREAST CANCER: ICD-10-CM

## 2021-03-18 PROCEDURE — C8937 CAD BREAST MRI: HCPCS

## 2021-03-18 PROCEDURE — A9585 GADOBUTROL INJECTION: HCPCS | Performed by: OBSTETRICS & GYNECOLOGY

## 2021-03-18 PROCEDURE — G1004 CDSM NDSC: HCPCS

## 2021-03-18 PROCEDURE — C8908 MRI W/O FOL W/CONT, BREAST,: HCPCS

## 2021-03-18 RX ADMIN — GADOBUTROL 6 ML: 604.72 INJECTION INTRAVENOUS at 16:52

## 2021-06-02 DIAGNOSIS — F41.9 ANXIETY: ICD-10-CM

## 2021-06-02 RX ORDER — FLUOXETINE HYDROCHLORIDE 20 MG/1
20 CAPSULE ORAL DAILY
Qty: 90 CAPSULE | Refills: 3 | Status: SHIPPED | OUTPATIENT
Start: 2021-06-02

## 2021-06-02 NOTE — TELEPHONE ENCOUNTER
Patient called to report she's had a few episodes of diarrhea and it's accompanied with a bad odor and she believes she has C-diff again

## 2021-08-19 DIAGNOSIS — F41.9 ANXIETY: ICD-10-CM

## 2021-08-19 RX ORDER — ALPRAZOLAM 0.25 MG/1
0.25 TABLET ORAL 2 TIMES DAILY PRN
Qty: 180 TABLET | Refills: 0 | Status: SHIPPED | OUTPATIENT
Start: 2021-08-19 | End: 2022-02-24 | Stop reason: SDUPTHER

## 2021-08-19 NOTE — TELEPHONE ENCOUNTER
Per PDMP last fill 02/22/21  Last OV 03/26/20, Next OV 11/23/21 02/22/2021 1 02/22/2021   ALPRAZOLAM 0 25 MG TABLET  180 0 90 CA BRO   [id number]  [facility] (3152) 0  Comm Ins [place]    08/28/2020 1 08/28/2020   ALPRAZOLAM 0 25 MG TABLET  180 0 90 CA BRO   [id number]  [facility] (3154) 0  Comm Ins [place]

## 2021-08-20 ENCOUNTER — VBI (OUTPATIENT)
Dept: ADMINISTRATIVE | Facility: OTHER | Age: 61
End: 2021-08-20

## 2021-08-31 ENCOUNTER — ANNUAL EXAM (OUTPATIENT)
Dept: OBGYN CLINIC | Facility: CLINIC | Age: 61
End: 2021-08-31
Payer: COMMERCIAL

## 2021-08-31 VITALS — BODY MASS INDEX: 21.63 KG/M2 | WEIGHT: 130 LBS | SYSTOLIC BLOOD PRESSURE: 124 MMHG | DIASTOLIC BLOOD PRESSURE: 68 MMHG

## 2021-08-31 DIAGNOSIS — Z11.51 SCREENING FOR HPV (HUMAN PAPILLOMAVIRUS): ICD-10-CM

## 2021-08-31 DIAGNOSIS — Z12.31 ENCOUNTER FOR SCREENING MAMMOGRAM FOR MALIGNANT NEOPLASM OF BREAST: ICD-10-CM

## 2021-08-31 DIAGNOSIS — Z91.89 INCREASED RISK OF BREAST CANCER: ICD-10-CM

## 2021-08-31 DIAGNOSIS — Z12.4 ENCOUNTER FOR PAPANICOLAOU SMEAR FOR CERVICAL CANCER SCREENING: ICD-10-CM

## 2021-08-31 DIAGNOSIS — Z01.419 ENCOUNTER FOR ANNUAL ROUTINE GYNECOLOGICAL EXAMINATION: Primary | ICD-10-CM

## 2021-08-31 PROCEDURE — G0145 SCR C/V CYTO,THINLAYER,RESCR: HCPCS | Performed by: OBSTETRICS & GYNECOLOGY

## 2021-08-31 PROCEDURE — S0612 ANNUAL GYNECOLOGICAL EXAMINA: HCPCS | Performed by: OBSTETRICS & GYNECOLOGY

## 2021-08-31 PROCEDURE — G0476 HPV COMBO ASSAY CA SCREEN: HCPCS | Performed by: OBSTETRICS & GYNECOLOGY

## 2021-08-31 NOTE — PROGRESS NOTES
Assessment/Plan:  Normal gynecologic exam  Co-testing '- done  Return the office in 1 year  Self breast exams monthly  Annual 3-D mammography  Lifetime Risk of Breast Cancer 21 26 % - annual MRI 6 mo after Mammogram  Colonoscopy- 10/20 2 polyps- repeat 5 yrs  Calcium 1000 milligrams with vitamin D- does  Exercise 3 times a week- does  Diagnoses and all orders for this visit:    Encounter for annual routine gynecological examination    Encounter for screening mammogram for malignant neoplasm of breast  -     Mammo screening bilateral w 3d & cad; Future    Increased risk of breast cancer  -     MRI breast bilateral w and wo contrast w cad; Future    Encounter for Papanicolaou smear for cervical cancer screening  -     Liquid-based pap, screening    Screening for HPV (human papillomavirus)  -     Liquid-based pap, screening              Subjective:        Patient ID: Gloria Estrada is a 59 y o  female  Walter Barraza is here for a yearly evaluation  She is without any gynecologic complaints  She denies any incontinence or vaginal bleeding  She is not sexually active  She had a colonoscopy 10/20 after having a positive Cologuard test  There were 2 polyps in the recommendation was to repeat this in 5 years  The following portions of the patient's history were reviewed and updated as appropriate: She  has a past medical history of Fractures    Patient Active Problem List    Diagnosis Date Noted    Adhesive capsulitis of right shoulder 10/01/2018    Encounter for annual routine gynecological examination 2018    Encounter for screening mammogram for breast cancer 2018    Chronic right shoulder pain 09/10/2018    Subacromial bursitis of right shoulder joint 2018    Tendonitis of shoulder, right 2018    Biceps tendinosis of right shoulder 2018   PMH:    1 para 1;  '88  Dr Kristal Aguilera  Menopause 50  Anxiety 23  Dysmenorrhea  Primary Infertility- diagnostic laparoscopy with endometrial biopsy '82  Left  excisional  breast biopsy '79  Menopausal symptoms '11  Bilateral cataracts removed 8/16, 9/16      Right shoulder adhesive capsulitis 10/18 PT resolved 12/18   She  has a past surgical history that includes Endometrial biopsy (1982); Breast surgery (Left); Cataract extraction; Diagnostic laparoscopy (5323); Cornell tooth extraction; Tonsillectomy; FL injection right shoulder (arthrogram) (9/28/2018); and Breast cyst excision (Left, 1979)  Her family history includes Breast cancer (age of onset: 48) in her paternal aunt and paternal aunt; Diabetes in her maternal grandfather and mother; Heart attack in her paternal grandfather; Hypertension in her father and mother; No Known Problems in her brother, daughter, and sister; Osteoporosis in her mother; Stroke in her mother  FH:  M 76- poorly controlled diabetes leading to several wound problems and kidney disease  Osteoporosis,HTN, CVA d 7/18 combination of things      F- 79 DM, Fall 7/16-fractured femur (surgery); screws broke, Hip Replacement 10/16; 2 episodes of Hip Disclocations, still active at 78 ['21]      B- nervous breakdown 54 7/18  She  reports that she has never smoked  She has never used smokeless tobacco  She reports current alcohol use  She reports that she does not use drugs  SH:  [address]-   '20 had only 1 COVID case and that patient was in hospice    Narayan Gary 8/88- , has a house '19  She took [employer] for a cruise to celebrate her 58th  Current Outpatient Medications   Medication Sig Dispense Refill    ALPRAZolam (XANAX) 0 25 mg tablet Take 1 tablet (0 25 mg total) by mouth 2 (two) times a day as needed for anxiety 180 tablet 0    FLUoxetine (PROzac) 20 mg capsule Take 1 capsule (20 mg total) by mouth daily 90 capsule 3    Multiple Vitamins-Minerals (MULTIVITAMIN ADULTS) TABS Take 1 tablet by mouth daily       No current facility-administered medications for this visit  Current Outpatient Medications on File Prior to Visit   Medication Sig    ALPRAZolam (XANAX) 0 25 mg tablet Take 1 tablet (0 25 mg total) by mouth 2 (two) times a day as needed for anxiety    FLUoxetine (PROzac) 20 mg capsule Take 1 capsule (20 mg total) by mouth daily    Multiple Vitamins-Minerals (MULTIVITAMIN ADULTS) TABS Take 1 tablet by mouth daily     No current facility-administered medications on file prior to visit  She is allergic to azithromycin and penicillins       Review of Systems   Constitutional: Negative for activity change, appetite change, fatigue and unexpected weight change  Eyes: Negative for visual disturbance  Respiratory: Negative for cough, chest tightness, shortness of breath and wheezing  Cardiovascular: Negative for chest pain, palpitations and leg swelling  Breast: Patient denies tenderness, nipple discharge, masses, or erythema  Gastrointestinal: Negative for abdominal distention, abdominal pain, blood in stool, constipation, diarrhea, nausea and vomiting  Endocrine: Negative for cold intolerance and heat intolerance  Genitourinary: Negative for decreased urine volume, difficulty urinating, dysuria, frequency, hematuria, menstrual problem, pelvic pain, urgency, vaginal bleeding, vaginal discharge and vaginal pain  Musculoskeletal: Negative for arthralgias  Skin: Negative for rash  Neurological: Negative for weakness, light-headedness, numbness and headaches  Hematological: Does not bruise/bleed easily  Psychiatric/Behavioral: Negative for agitation, behavioral problems and sleep disturbance  The patient is not nervous/anxious  Objective:    Vitals:    08/31/21 1502   BP: 124/68   Weight: 59 kg (130 lb)            Physical Exam  Vitals and nursing note reviewed  Constitutional:       General: She is not in acute distress  Appearance: She is well-developed  HENT:      Head: Normocephalic and atraumatic     Eyes:      General: No scleral icterus  Right eye: No discharge  Left eye: No discharge  Extraocular Movements: Extraocular movements intact  Conjunctiva/sclera: Conjunctivae normal    Neck:      Thyroid: No thyromegaly  Trachea: No tracheal deviation  Cardiovascular:      Rate and Rhythm: Normal rate and regular rhythm  Heart sounds: Normal heart sounds  No murmur heard  Pulmonary:      Effort: Pulmonary effort is normal  No respiratory distress  Breath sounds: Normal breath sounds  No wheezing  Chest:      Breasts: Breasts are symmetrical          Right: No inverted nipple, mass, nipple discharge, skin change or tenderness  Left: No inverted nipple, mass, nipple discharge, skin change or tenderness  Abdominal:      General: Bowel sounds are normal  There is no distension  Palpations: Abdomen is soft  There is no mass  Tenderness: There is no abdominal tenderness  There is no guarding or rebound  Genitourinary:     General: Normal vulva  Labia:         Right: No rash, tenderness or lesion  Left: No rash, tenderness or lesion  Vagina: Normal       Cervix: No cervical motion tenderness or discharge  Uterus: Not deviated, not enlarged and not tender  Adnexa:         Right: No mass, tenderness or fullness  Left: No mass, tenderness or fullness  Rectum: No external hemorrhoid  Comments: Urethral meatus within normal limits  Perineum within normal limits  Bladder well supported  Physiologic vaginal atrophy  Musculoskeletal:         General: No tenderness  Normal range of motion  Cervical back: Normal range of motion and neck supple  Lymphadenopathy:      Cervical: No cervical adenopathy  Skin:     General: Skin is warm and dry  Neurological:      Mental Status: She is alert and oriented to person, place, and time     Psychiatric:         Mood and Affect: Mood normal          Behavior: Behavior normal  Thought Content:  Thought content normal          Judgment: Judgment normal

## 2021-09-01 LAB
HPV HR 12 DNA CVX QL NAA+PROBE: NEGATIVE
HPV16 DNA CVX QL NAA+PROBE: NEGATIVE
HPV18 DNA CVX QL NAA+PROBE: NEGATIVE

## 2021-09-03 LAB
LAB AP GYN PRIMARY INTERPRETATION: NORMAL
Lab: NORMAL

## 2021-10-26 ENCOUNTER — HOSPITAL ENCOUNTER (OUTPATIENT)
Dept: RADIOLOGY | Age: 61
Discharge: HOME | End: 2021-10-26
Payer: COMMERCIAL

## 2021-10-26 VITALS — WEIGHT: 130 LBS | HEIGHT: 65 IN | BODY MASS INDEX: 21.66 KG/M2

## 2021-10-26 DIAGNOSIS — Z12.31 ENCOUNTER FOR SCREENING MAMMOGRAM FOR MALIGNANT NEOPLASM OF BREAST: ICD-10-CM

## 2021-10-26 PROCEDURE — 77067 SCR MAMMO BI INCL CAD: CPT

## 2021-10-26 PROCEDURE — 77063 BREAST TOMOSYNTHESIS BI: CPT

## 2021-11-23 ENCOUNTER — TELEPHONE (OUTPATIENT)
Dept: FAMILY MEDICINE CLINIC | Facility: CLINIC | Age: 61
End: 2021-11-23

## 2021-11-23 DIAGNOSIS — R53.83 OTHER FATIGUE: ICD-10-CM

## 2021-11-23 DIAGNOSIS — E78.00 HYPERCHOLESTEROLEMIA: Primary | ICD-10-CM

## 2021-12-11 ENCOUNTER — APPOINTMENT (OUTPATIENT)
Dept: LAB | Facility: MEDICAL CENTER | Age: 61
End: 2021-12-11
Payer: COMMERCIAL

## 2021-12-11 DIAGNOSIS — E78.00 HYPERCHOLESTEROLEMIA: ICD-10-CM

## 2021-12-11 DIAGNOSIS — R53.83 OTHER FATIGUE: ICD-10-CM

## 2021-12-11 LAB
ALBUMIN SERPL BCP-MCNC: 3.7 G/DL (ref 3.5–5)
ALP SERPL-CCNC: 63 U/L (ref 46–116)
ALT SERPL W P-5'-P-CCNC: 24 U/L (ref 12–78)
ANION GAP SERPL CALCULATED.3IONS-SCNC: 4 MMOL/L (ref 4–13)
AST SERPL W P-5'-P-CCNC: 23 U/L (ref 5–45)
BILIRUB SERPL-MCNC: 0.43 MG/DL (ref 0.2–1)
BUN SERPL-MCNC: 24 MG/DL (ref 5–25)
CALCIUM SERPL-MCNC: 9.5 MG/DL (ref 8.3–10.1)
CHLORIDE SERPL-SCNC: 106 MMOL/L (ref 100–108)
CHOLEST SERPL-MCNC: 214 MG/DL
CO2 SERPL-SCNC: 29 MMOL/L (ref 21–32)
CREAT SERPL-MCNC: 1.02 MG/DL (ref 0.6–1.3)
GFR SERPL CREATININE-BSD FRML MDRD: 59 ML/MIN/1.73SQ M
GLUCOSE P FAST SERPL-MCNC: 85 MG/DL (ref 65–99)
HDLC SERPL-MCNC: 82 MG/DL
LDLC SERPL CALC-MCNC: 121 MG/DL (ref 0–100)
POTASSIUM SERPL-SCNC: 4.3 MMOL/L (ref 3.5–5.3)
PROT SERPL-MCNC: 7.2 G/DL (ref 6.4–8.2)
SODIUM SERPL-SCNC: 139 MMOL/L (ref 136–145)
TRIGL SERPL-MCNC: 56 MG/DL

## 2021-12-11 PROCEDURE — 80061 LIPID PANEL: CPT

## 2021-12-11 PROCEDURE — 80053 COMPREHEN METABOLIC PANEL: CPT

## 2021-12-11 PROCEDURE — 36415 COLL VENOUS BLD VENIPUNCTURE: CPT

## 2021-12-16 ENCOUNTER — RA CDI HCC (OUTPATIENT)
Dept: OTHER | Facility: HOSPITAL | Age: 61
End: 2021-12-16

## 2021-12-23 ENCOUNTER — OFFICE VISIT (OUTPATIENT)
Dept: FAMILY MEDICINE CLINIC | Facility: CLINIC | Age: 61
End: 2021-12-23
Payer: COMMERCIAL

## 2021-12-23 VITALS
WEIGHT: 125.8 LBS | SYSTOLIC BLOOD PRESSURE: 116 MMHG | OXYGEN SATURATION: 100 % | HEIGHT: 65 IN | TEMPERATURE: 96.1 F | HEART RATE: 61 BPM | DIASTOLIC BLOOD PRESSURE: 76 MMHG | BODY MASS INDEX: 20.96 KG/M2

## 2021-12-23 DIAGNOSIS — Z00.00 ANNUAL PHYSICAL EXAM: Primary | ICD-10-CM

## 2021-12-23 PROCEDURE — 1036F TOBACCO NON-USER: CPT | Performed by: FAMILY MEDICINE

## 2021-12-23 PROCEDURE — 3008F BODY MASS INDEX DOCD: CPT | Performed by: FAMILY MEDICINE

## 2021-12-23 PROCEDURE — 99396 PREV VISIT EST AGE 40-64: CPT | Performed by: FAMILY MEDICINE

## 2021-12-23 PROCEDURE — 3725F SCREEN DEPRESSION PERFORMED: CPT | Performed by: FAMILY MEDICINE

## 2022-01-04 ENCOUNTER — OFFICE VISIT (OUTPATIENT)
Dept: URGENT CARE | Age: 62
End: 2022-01-04
Payer: COMMERCIAL

## 2022-01-04 VITALS — RESPIRATION RATE: 16 BRPM | TEMPERATURE: 97 F | OXYGEN SATURATION: 99 % | HEART RATE: 66 BPM

## 2022-01-04 DIAGNOSIS — J01.90 ACUTE SINUSITIS, RECURRENCE NOT SPECIFIED, UNSPECIFIED LOCATION: Primary | ICD-10-CM

## 2022-01-04 PROCEDURE — 99213 OFFICE O/P EST LOW 20 MIN: CPT | Performed by: PHYSICIAN ASSISTANT

## 2022-01-04 RX ORDER — CEFUROXIME AXETIL 500 MG/1
500 TABLET ORAL EVERY 12 HOURS SCHEDULED
Qty: 20 TABLET | Refills: 0 | Status: SHIPPED | OUTPATIENT
Start: 2022-01-04 | End: 2022-01-14

## 2022-01-04 NOTE — LETTER
January 4, 2022     Patient: Diann Mart   YOB: 1960   Date of Visit: 1/4/2022       To Whom It May Concern:    Please excuse Kourtney Thompson from work 01/05/2022  Patient may return to work 01/06/2022           Sincerely,        Anselmo Carmen PA-C    CC: No Recipients

## 2022-01-05 NOTE — PROGRESS NOTES
[address] [facility]        NAME: Mukesh Villarreal is a 59 y o  female  : 1960    MRN: [id number]  DATE: 2022  TIME: 8:22 PM    Assessment and Plan   Acute sinusitis, recurrence not specified, unspecified location [J01 90]  1  Acute sinusitis, recurrence not specified, unspecified location  cefuroxime (CEFTIN) 500 mg tablet         Patient Instructions       Follow up with PCP in 3-5 days  Proceed to  ER if symptoms worsen  Chief Complaint     Chief Complaint   Patient presents with    Sinusitis     pt states started with post nasal drip on Friday, c/o pressure in face and severe sinus congestion, can't blow anything out of nose  Pt had a negative covid test at work yesterday  History of Present Illness       Patient is here for evaluation sinus congestion, pressure and headache for the past 5 days  Patient with history of sinus infections in the past     Sinusitis  Associated symptoms include congestion and sinus pressure  Pertinent negatives include no ear pain or sore throat  Review of Systems   Review of Systems   Constitutional: Negative  HENT: Positive for congestion, postnasal drip, rhinorrhea and sinus pressure  Negative for ear discharge, ear pain, sinus pain, sore throat and trouble swallowing  Eyes: Negative  Respiratory: Negative  Cardiovascular: Negative  Gastrointestinal: Negative            Current Medications       Current Outpatient Medications:     ALPRAZolam (XANAX) 0 25 mg tablet, Take 1 tablet (0 25 mg total) by mouth 2 (two) times a day as needed for anxiety, Disp: 180 tablet, Rfl: 0    FLUoxetine (PROzac) 20 mg capsule, Take 1 capsule (20 mg total) by mouth daily, Disp: 90 capsule, Rfl: 3    Multiple Vitamins-Minerals (MULTIVITAMIN ADULTS) TABS, Take 1 tablet by mouth daily, Disp: , Rfl:     cefuroxime (CEFTIN) 500 mg tablet, Take 1 tablet (500 mg total) by mouth every 12 (twelve) hours for 10 days, Disp: 20 tablet, Rfl: 0    Current Allergies     Allergies as of 01/04/2022 - Reviewed 01/04/2022   Allergen Reaction Noted    Azithromycin Hives 04/21/2012    Penicillins Rash 04/01/2013            The following portions of the patient's history were reviewed and updated as appropriate: allergies, current medications, past family history, past medical history, past social history, past surgical history and problem list      Past Medical History:   Diagnosis Date    Fractures        Past Surgical History:   Procedure Laterality Date    BREAST CYST EXCISION Left 1979    fibroid tumor removed, 1979    BREAST SURGERY Left     Removal of fibroid tumor  Resolved: 1980    CATARACT EXTRACTION      Last assessed: 10/11/16    DIAGNOSTIC LAPAROSCOPY  1982    ENDOMETRIAL BIOPSY  1982    w/o cervical dilation    FL INJECTION RIGHT SHOULDER (ARTHROGRAM)  9/28/2018    TONSILLECTOMY      WISDOM TOOTH EXTRACTION      Resolved: 1977       Family History   Problem Relation Age of Onset    Diabetes Mother         Mellitus    Stroke Mother         CVA    Osteoporosis Mother     Hypertension Mother     Hypertension Father     Diabetes Maternal Grandfather         Mellitus    Heart attack Paternal Grandfather         Myocardial Infarction    Breast cancer Paternal Aunt 48    No Known Problems Sister     No Known Problems Daughter     Breast cancer Paternal Aunt 48    No Known Problems Brother          Medications have been verified  Objective   Pulse 66   Temp (!) 97 °F (36 1 °C)   Resp 16   LMP  (LMP Unknown)   SpO2 99%   No LMP recorded (lmp unknown)  Patient is postmenopausal        Physical Exam     Physical Exam  Vitals and nursing note reviewed  Constitutional:       General: She is not in acute distress  Appearance: Normal appearance  She is well-developed  She is not ill-appearing, toxic-appearing or diaphoretic  HENT:      Head: Normocephalic and atraumatic        Right Ear: Tympanic membrane and ear canal normal  Left Ear: Tympanic membrane and ear canal normal       Nose: Congestion present  Comments: Bilateral nasal erythema and bilateral maxillary and frontal sinus tenderness  Mouth/Throat:      Mouth: Mucous membranes are moist       Pharynx: Posterior oropharyngeal erythema present  No oropharyngeal exudate  Eyes:      General:         Right eye: No discharge  Left eye: No discharge  Extraocular Movements: Extraocular movements intact  Conjunctiva/sclera: Conjunctivae normal       Pupils: Pupils are equal, round, and reactive to light  Cardiovascular:      Rate and Rhythm: Normal rate and regular rhythm  Heart sounds: Normal heart sounds  No murmur heard  Pulmonary:      Effort: Pulmonary effort is normal  No respiratory distress  Breath sounds: Normal breath sounds  No stridor  No wheezing, rhonchi or rales  Lymphadenopathy:      Cervical: No cervical adenopathy  Skin:     General: Skin is warm and dry  Findings: No rash  Neurological:      General: No focal deficit present  Mental Status: She is alert and oriented to person, place, and time  Psychiatric:         Mood and Affect: Mood normal          Behavior: Behavior normal          Thought Content:  Thought content normal          Judgment: Judgment normal

## 2022-02-18 ENCOUNTER — OFFICE VISIT (OUTPATIENT)
Dept: FAMILY MEDICINE CLINIC | Facility: CLINIC | Age: 62
End: 2022-02-18
Payer: COMMERCIAL

## 2022-02-18 VITALS
WEIGHT: 131 LBS | TEMPERATURE: 98.5 F | HEART RATE: 64 BPM | DIASTOLIC BLOOD PRESSURE: 68 MMHG | HEIGHT: 65 IN | BODY MASS INDEX: 21.83 KG/M2 | SYSTOLIC BLOOD PRESSURE: 114 MMHG

## 2022-02-18 DIAGNOSIS — L03.012 CELLULITIS OF FINGER OF LEFT HAND: Primary | ICD-10-CM

## 2022-02-18 PROCEDURE — 1036F TOBACCO NON-USER: CPT | Performed by: NURSE PRACTITIONER

## 2022-02-18 PROCEDURE — 99214 OFFICE O/P EST MOD 30 MIN: CPT | Performed by: NURSE PRACTITIONER

## 2022-02-18 PROCEDURE — 3008F BODY MASS INDEX DOCD: CPT | Performed by: NURSE PRACTITIONER

## 2022-02-18 RX ORDER — CEPHALEXIN 500 MG/1
500 CAPSULE ORAL EVERY 8 HOURS SCHEDULED
Qty: 21 CAPSULE | Refills: 0 | Status: SHIPPED | OUTPATIENT
Start: 2022-02-18 | End: 2022-02-25

## 2022-02-18 NOTE — PROGRESS NOTES
Assessment/Plan:     Diagnoses and all orders for this visit:    Cellulitis of finger of left hand  -     cephalexin (KEFLEX) 500 mg capsule; Take 1 capsule (500 mg total) by mouth every 8 (eight) hours for 7 days        Discussed with patient plan to treat with 7 day course of cephalexin - patient has allergy to PCN but has had cephalosporin in the past without reaction  Discussed with patient recommendation to soak finger in epsom salts twice a day for 10-15 minutes  Patient instructed to call if no improvement in 72 hours or symptoms worsen    Subjective:    Patient ID: Nenita Conway is a 59 y o  female  59 y  o female presenting with a red and swollen left index finger for the past 3 days  Patient does not recall injuring or trauma to the finger  The finger seems to have a small cut that has become infected leading to swelling going up the finger and also into the wrist   Patient denies numbness or weakness of the hand but the reddened area is warm to touch and tender to touch          Family History   Problem Relation Age of Onset    Diabetes Mother         Mellitus    Stroke Mother         CVA    Osteoporosis Mother     Hypertension Mother     Hypertension Father     Diabetes Maternal Grandfather         Mellitus    Heart attack Paternal Grandfather         Myocardial Infarction    Breast cancer Paternal Aunt 48    No Known Problems Sister     No Known Problems Daughter     Breast cancer Paternal Aunt 48    No Known Problems Brother      Social History     Socioeconomic History    Marital status: /Civil Union     Spouse name: Not on file    Number of children: 1    Years of education: Not on file    Highest education level: Not on file   Occupational History    Occupation: Full-time employment   Tobacco Use    Smoking status: Never Smoker    Smokeless tobacco: Never Used   Vaping Use    Vaping Use: Never used   Substance and Sexual Activity    Alcohol use: Yes     Comment: Acute    Drug use: No    Sexual activity: Not Currently     Partners: Male   Other Topics Concern    Not on file   Social History Narrative    Daily caffeine consumption: 2-3 servings a day    Exercises 1 to 2 times per week     Social Determinants of Health     Financial Resource Strain: Not on file   Food Insecurity: Not on file   Transportation Needs: Not on file   Physical Activity: Not on file   Stress: Not on file   Social Connections: Not on file   Intimate Partner Violence: Not on file   Housing Stability: Not on file     E-Cigarette/Vaping    E-Cigarette Use Never User      E-Cigarette/Vaping Substances    Nicotine No     THC No     CBD No     Flavoring No     Other No     Unknown No      Past Medical History:   Diagnosis Date    Fractures      Past Surgical History:   Procedure Laterality Date    BREAST CYST EXCISION Left 1979    fibroid tumor removed, 1979    BREAST SURGERY Left     Removal of fibroid tumor  Resolved: 1980    CATARACT EXTRACTION      Last assessed: 10/11/16    DIAGNOSTIC LAPAROSCOPY  1982    ENDOMETRIAL BIOPSY  1982    w/o cervical dilation    FL INJECTION RIGHT SHOULDER (ARTHROGRAM)  9/28/2018    TONSILLECTOMY      WISDOM TOOTH EXTRACTION      Resolved: 1977     Allergies   Allergen Reactions    Azithromycin Hives     Reaction Date: 03Aug2011;     Penicillins Rash       Current Outpatient Medications:     ALPRAZolam (XANAX) 0 25 mg tablet, Take 1 tablet (0 25 mg total) by mouth 2 (two) times a day as needed for anxiety, Disp: 180 tablet, Rfl: 0    FLUoxetine (PROzac) 20 mg capsule, Take 1 capsule (20 mg total) by mouth daily, Disp: 90 capsule, Rfl: 3    Multiple Vitamins-Minerals (MULTIVITAMIN ADULTS) TABS, Take 1 tablet by mouth daily, Disp: , Rfl:     cephalexin (KEFLEX) 500 mg capsule, Take 1 capsule (500 mg total) by mouth every 8 (eight) hours for 7 days, Disp: 21 capsule, Rfl: 0    Review of Systems   Constitutional: Negative for chills, fatigue and fever  Respiratory: Negative  Cardiovascular: Negative  Musculoskeletal: Positive for joint swelling and myalgias  Skin: Positive for wound  Neurological: Negative  Psychiatric/Behavioral: Negative  Objective:    /68   Pulse 64   Temp 98 5 °F (36 9 °C)   Ht 5' 5" (1 651 m)   Wt 59 4 kg (131 lb)   LMP  (LMP Unknown)   BMI 21 80 kg/m² (Reviewed)     Physical Exam  Vitals reviewed  Constitutional:       General: She is not in acute distress  Appearance: She is well-developed and well-groomed  She is not ill-appearing  HENT:      Head: Normocephalic and atraumatic  Right Ear: External ear normal       Left Ear: External ear normal       Nose:      Comments: Patient had a facial covering in place as per office protocol  Eyes:      General: Lids are normal       Extraocular Movements: Extraocular movements intact  Conjunctiva/sclera: Conjunctivae normal       Pupils: Pupils are equal, round, and reactive to light  Neck:      Trachea: Trachea and phonation normal    Cardiovascular:      Rate and Rhythm: Normal rate and regular rhythm  Pulses: Normal pulses  Heart sounds: Normal heart sounds  Pulmonary:      Effort: Pulmonary effort is normal       Breath sounds: Normal breath sounds  Musculoskeletal:      Left hand: Swelling and tenderness present  No bony tenderness  Normal range of motion  Normal strength  Normal capillary refill  Arms:    Skin:     General: Skin is warm and dry  Capillary Refill: Capillary refill takes less than 2 seconds  Neurological:      General: No focal deficit present  Mental Status: She is alert and oriented to person, place, and time  Psychiatric:         Mood and Affect: Mood normal          Behavior: Behavior normal  Behavior is cooperative  Thought Content:  Thought content normal

## 2022-02-23 ENCOUNTER — TELEPHONE (OUTPATIENT)
Dept: OBGYN CLINIC | Facility: CLINIC | Age: 62
End: 2022-02-23

## 2022-02-23 NOTE — TELEPHONE ENCOUNTER
Pt has a mammo scheduled for 3/28 and needs a prior authorization for insurance reasons to make sure it is covered     Please advise

## 2022-02-24 DIAGNOSIS — F41.9 ANXIETY: ICD-10-CM

## 2022-02-24 RX ORDER — ALPRAZOLAM 0.25 MG/1
0.25 TABLET ORAL 2 TIMES DAILY PRN
Qty: 180 TABLET | Refills: 0 | Status: SHIPPED | OUTPATIENT
Start: 2022-02-24

## 2022-02-24 NOTE — TELEPHONE ENCOUNTER
1  [id number] 08/19/2021 08/19/2021 ALPRAZolam 180 0 90 0 25 MG  N [facility]

## 2022-03-28 ENCOUNTER — HOSPITAL ENCOUNTER (OUTPATIENT)
Dept: RADIOLOGY | Facility: HOSPITAL | Age: 62
Discharge: HOME | End: 2022-03-28
Attending: OBSTETRICS & GYNECOLOGY
Payer: COMMERCIAL

## 2022-03-28 DIAGNOSIS — Z91.89 INCREASED RISK OF BREAST CANCER: ICD-10-CM

## 2022-03-28 PROCEDURE — C8908 MRI W/O FOL W/CONT, BREAST,: HCPCS

## 2022-03-28 PROCEDURE — G1004 CDSM NDSC: HCPCS

## 2022-03-28 PROCEDURE — A9585 GADOBUTROL INJECTION: HCPCS | Performed by: OBSTETRICS & GYNECOLOGY

## 2022-03-28 PROCEDURE — C8937 CAD BREAST MRI: HCPCS

## 2022-03-28 RX ADMIN — GADOBUTROL 6 ML: 604.72 INJECTION INTRAVENOUS at 16:43

## 2022-07-27 ENCOUNTER — TELEPHONE (OUTPATIENT)
Dept: FAMILY MEDICINE CLINIC | Facility: CLINIC | Age: 62
End: 2022-07-27

## 2022-07-27 ENCOUNTER — TELEMEDICINE (OUTPATIENT)
Dept: FAMILY MEDICINE CLINIC | Facility: CLINIC | Age: 62
End: 2022-07-27
Payer: COMMERCIAL

## 2022-07-27 DIAGNOSIS — U07.1 COVID-19: Primary | ICD-10-CM

## 2022-07-27 PROCEDURE — 99213 OFFICE O/P EST LOW 20 MIN: CPT | Performed by: FAMILY MEDICINE

## 2022-07-27 RX ORDER — CEFUROXIME AXETIL 500 MG/1
500 TABLET ORAL EVERY 12 HOURS SCHEDULED
Qty: 20 TABLET | Refills: 0 | Status: SHIPPED | OUTPATIENT
Start: 2022-07-27 | End: 2022-08-06

## 2022-07-27 NOTE — TELEPHONE ENCOUNTER
POSITIVE at home COVID test 7/27/22    Symptoms 7/24: itchy throat, 100 temp, nasal congestion, cough, post nasal drip, facial pain, headache, fatigue  Patient is vaccinated with 2 boosters  Patient asking what to do from here  She will need an excuse to return to work, and is asking for medication to be sent to her pharmacy

## 2022-07-27 NOTE — PROGRESS NOTES
COVID-19 Outpatient Progress Note    Assessment/Plan:    Problem List Items Addressed This Visit    None        Disposition:     Patient has COVID-19 infection  Based off CDC guidelines, they were recommended to isolate for 5 days from the date of the positive test  If they remain asymptomatic, isolation may be ended followed by 5 days of wearing a mask when around othes to minimize risk of infecting others  If they have a fever, continue to stay home until fever resolves for at least 24 hours  I have spent 15 minutes directly with the patient  Greater than 50% of this time was spent in counseling/coordination of care regarding: diagnostic results, prognosis, risks and benefits of treatment options, instructions for management, patient and family education, importance of treatment compliance, risk factor reductions and impressions  Encounter provider Nelida Bustillos DO    Provider located at [address] [place] [id number]    Recent Visits  No visits were found meeting these conditions  Showing recent visits within past 7 days and meeting all other requirements  Today's Visits  Date Type Provider Dept   07/27/22 Telephone Josie [address] [place] [address] today's visits and meeting all other requirements  Future Appointments  No visits were found meeting these conditions  Showing future appointments within next 150 days and meeting all other requirements     This virtual check-in was done via telephone and she agrees to proceed  Patient agrees to participate in a virtual check in via telephone or video visit instead of presenting to the office to address urgent/immediate medical needs  Patient is aware this is a billable service  After connecting through Telephone, the patient was identified by name and date of birth   Carolina Julien was informed that this was a telemedicine visit and that the exam was being conducted confidentially over secure lines  My office door was closed  No one else was in the room  Danny Leigh acknowledged consent and understanding of privacy and security of the telemedicine visit  I informed the patient that I have reviewed her record in Epic and presented the opportunity for her to ask any questions regarding the visit today  The patient agreed to participate  It was my intent to perform this visit via video technology but the patient was not able to do a video connection so the visit was completed via audio telephone only  Verification of patient location:  Patient is located in the following state in which I hold an active license: [place]    Subjective:   Danny Leigh is a 59 y o  female who has been screened for COVID-19  Symptom change since last report: unchanged  Patient's symptoms include fever, fatigue, nasal congestion, cough, myalgias and headache  Patient denies chills, malaise, rhinorrhea, sore throat, anosmia, loss of taste, shortness of breath, chest tightness, abdominal pain, nausea, vomiting and diarrhea  - Date of symptom onset: 7/27/2022  - Date of positive COVID-19 test: 7/27/2022  Type of test: Home antigen  COVID-19 vaccination status: Fully vaccinated with booster    Allie Paez has been staying home and has isolated themselves in her home  She is taking care to not share personal items and is cleaning all surfaces that are touched often, like counters, tabletops, and doorknobs using household cleaning sprays or wipes  She is wearing a mask when she leaves her room  No results found for: [address] [facility] 98, 185 [facility], [address] [facility], [facility] 116, 350 [facility], 700 [facility]  Past Medical History:   Diagnosis Date    Fractures      Past Surgical History:   Procedure Laterality Date    BREAST CYST EXCISION Left 1979    fibroid tumor removed, 1979    BREAST SURGERY Left     Removal of fibroid tumor   Resolved: 1980    CATARACT EXTRACTION      Last assessed: 10/11/16    DIAGNOSTIC LAPAROSCOPY  1982    ENDOMETRIAL BIOPSY  1982    w/o cervical dilation    FL INJECTION RIGHT SHOULDER (ARTHROGRAM)  9/28/2018    TONSILLECTOMY      WISDOM TOOTH EXTRACTION      Resolved: 1977     Current Outpatient Medications   Medication Sig Dispense Refill    ALPRAZolam (XANAX) 0 25 mg tablet Take 1 tablet (0 25 mg total) by mouth 2 (two) times a day as needed for anxiety 180 tablet 0    FLUoxetine (PROzac) 20 mg capsule Take 1 capsule (20 mg total) by mouth daily 90 capsule 3    Multiple Vitamins-Minerals (MULTIVITAMIN ADULTS) TABS Take 1 tablet by mouth daily       No current facility-administered medications for this visit  Allergies   Allergen Reactions    Azithromycin Hives     Reaction Date: 03Aug2011;     Penicillins Rash       Review of Systems   Constitutional: Positive for fatigue and fever  Negative for chills  HENT: Positive for congestion  Negative for rhinorrhea and sore throat  Respiratory: Positive for cough  Negative for chest tightness, shortness of breath and wheezing  Gastrointestinal: Negative for abdominal pain, diarrhea, nausea and vomiting  Musculoskeletal: Positive for myalgias  Neurological: Positive for headaches  All other systems reviewed and are negative  Objective: There were no vitals filed for this visit  Physical Exam    VIRTUAL VISIT 25 [facility] verbally agrees to participate in [employer]  Pt is aware that [employer] could be limited without vital signs or the ability to perform a full hands-on physical Nenita Sinha understands she or the provider may request at any time to terminate the video visit and request the patient to seek care or treatment in person

## 2022-07-29 ENCOUNTER — TELEPHONE (OUTPATIENT)
Dept: FAMILY MEDICINE CLINIC | Facility: CLINIC | Age: 62
End: 2022-07-29

## 2022-07-29 NOTE — TELEPHONE ENCOUNTER
Patient called to update you about how she is feeling    She states all of her symptoms have pretty much resolved, except she still feels very tired  She is resting and napping during the day,      She feels that she will be able to return to work on Monday and is asking if we could put a note in her MyChart

## 2022-08-22 DIAGNOSIS — F41.9 ANXIETY: ICD-10-CM

## 2022-08-22 RX ORDER — FLUOXETINE HYDROCHLORIDE 20 MG/1
20 CAPSULE ORAL DAILY
Qty: 90 CAPSULE | Refills: 3 | Status: SHIPPED | OUTPATIENT
Start: 2022-08-22

## 2022-08-22 RX ORDER — ALPRAZOLAM 0.25 MG/1
0.25 TABLET ORAL 2 TIMES DAILY PRN
Qty: 180 TABLET | Refills: 0 | Status: SHIPPED | OUTPATIENT
Start: 2022-08-22

## 2022-08-22 NOTE — TELEPHONE ENCOUNTER
1  [id number] 02/24/2022 02/24/2022 ALPRAZolam (Tablet)  180 0 90 0 25 MG NA CANELO DALEYE LEIGHA [facility]  Cesar 'JOCELYNN' Us 0 / 0 [place]

## 2022-09-26 ENCOUNTER — TELEPHONE (OUTPATIENT)
Dept: FAMILY MEDICINE CLINIC | Facility: CLINIC | Age: 62
End: 2022-09-26

## 2022-09-26 NOTE — TELEPHONE ENCOUNTER
Patient called to schedule a yearly physical that is required for her insurance   It needs to be done by the end of the year, so she is scheduled with Pito Romano     She is asking for basic blood work to be ordered before her appointment, that she is required to get done, which includes and A1C and cholesterol

## 2022-09-27 DIAGNOSIS — Z00.00 ANNUAL PHYSICAL EXAM: Primary | ICD-10-CM

## 2022-10-04 ENCOUNTER — ANNUAL EXAM (OUTPATIENT)
Dept: OBGYN CLINIC | Facility: CLINIC | Age: 62
End: 2022-10-04
Payer: COMMERCIAL

## 2022-10-04 VITALS
DIASTOLIC BLOOD PRESSURE: 76 MMHG | SYSTOLIC BLOOD PRESSURE: 106 MMHG | HEIGHT: 65 IN | WEIGHT: 130.6 LBS | BODY MASS INDEX: 21.76 KG/M2

## 2022-10-04 DIAGNOSIS — Z91.89 INCREASED RISK OF BREAST CANCER: ICD-10-CM

## 2022-10-04 DIAGNOSIS — Z12.31 ENCOUNTER FOR SCREENING MAMMOGRAM FOR MALIGNANT NEOPLASM OF BREAST: ICD-10-CM

## 2022-10-04 DIAGNOSIS — Z01.419 ENCOUNTER FOR ANNUAL ROUTINE GYNECOLOGICAL EXAMINATION: Primary | ICD-10-CM

## 2022-10-04 PROCEDURE — S0612 ANNUAL GYNECOLOGICAL EXAMINA: HCPCS | Performed by: OBSTETRICS & GYNECOLOGY

## 2022-10-04 NOTE — PROGRESS NOTES
Assessment/Plan:  Normal gynecologic exam  Co-testing '21- negative  Return the office in 1 year  Self breast exams monthly  Annual 3-D mammography  Lifetime Risk of Breast Cancer 21 26 % - annual MRI 6 mo after Mammogram; 10/21 now 7 64 % ? - query sent to Dr Abdoul Pizano  Colonoscopy- 10/20 2 polyps- repeat 5 yrs  Calcium 1000 milligrams with vitamin D- does  Exercise 3 times a week- does       Addendum: 10/5/22  From Dr Abdoul Pizano  '20 her BMI was listed as 46 48 and the breast were more dense than '21  True BMI is 21  This explains the discrepancy  Diagnoses and all orders for this visit:    Encounter for annual routine gynecological examination    Encounter for screening mammogram for malignant neoplasm of breast  -     Mammo screening bilateral w 3d & cad; Future    Increased risk of breast cancer              Subjective:        Patient ID: Mukesh Villarreal is a 57 y o  female  Zo Clark returns for a yearly evaluation  She has no gynecologic complaints  She denies any vaginal bleeding  She is not sexually active  Her health has not changed at all  We discussed the recent mammograms and the lifetime risk of breast cancer discrepancy  She may have told them that her paternal aunts had breast cancer in '20  She may have corrected that in 2021-they were paternal great-aunts  I have sent a query to the reading radiologist from the most recent study  The following portions of the patient's history were reviewed and updated as appropriate: She  has a past medical history of Fractures    Patient Active Problem List    Diagnosis Date Noted    Encounter for screening mammogram for malignant neoplasm of breast 10/04/2022    Increased risk of breast cancer 10/04/2022    Adhesive capsulitis of right shoulder 10/01/2018    Encounter for annual routine gynecological examination 09/20/2018    Encounter for screening mammogram for breast cancer 09/20/2018    Chronic right shoulder pain 09/10/2018    Subacromial bursitis of right shoulder joint 2018    Tendonitis of shoulder, right 2018    Biceps tendinosis of right shoulder 2018    Anxiety 2012   PMH:    1 para 1;    Dr COSTA  Menopause 50  Anxiety 23  Dysmenorrhea  Primary Infertility- diagnostic laparoscopy with endometrial biopsy   Left  excisional  breast biopsy   Menopausal symptoms '11  Bilateral cataracts removed ,       Right shoulder adhesive capsulitis 10/18 PT resolved        Covid   She  has a past surgical history that includes Endometrial biopsy (); Breast surgery (Left); Cataract extraction; Diagnostic laparoscopy (43); [place] tooth extraction; Tonsillectomy; FL injection right shoulder (arthrogram) (2018); and Breast cyst excision (Left, )  Her family history includes Breast cancer (age of onset: 48) in her paternal aunt and paternal aunt; Diabetes in her maternal grandfather and mother; Heart attack in her paternal grandfather; Hypertension in her father and mother; No Known Problems in her brother, daughter, and sister; Osteoporosis in her mother; Stroke in her mother  FH:  M 76- poorly controlled diabetes leading to several wound problems and kidney disease  Osteoporosis,HTN, CVA d  combination of things      F- 79 DM, Fall -fractured femur (surgery); screws broke, Hip Replacement 10/16; 2 episodes of Hip Disclocations, still active at 78 ['21]      B- nervous breakdown 54   She  reports that she has never smoked  She has never used smokeless tobacco  She reports current alcohol use  She reports that she does not use drugs  SH:  [address]  20 had only 1 COVID case and that patient was in hospice     Fontanelle Mirza - , has a house   She took Adrian Dean for a cruise to celebrate her 58th    Current Outpatient Medications   Medication Sig Dispense Refill    ALPRAZolam (XANAX) 0 25 mg tablet Take 1 tablet (0 25 mg total) by mouth 2 (two) times a day as needed for anxiety 180 tablet 0    FLUoxetine (PROzac) 20 mg capsule Take 1 capsule (20 mg total) by mouth daily 90 capsule 3    Multiple Vitamins-Minerals (MULTIVITAMIN ADULTS) TABS Take 1 tablet by mouth daily       No current facility-administered medications for this visit  Current Outpatient Medications on File Prior to Visit   Medication Sig    ALPRAZolam (XANAX) 0 25 mg tablet Take 1 tablet (0 25 mg total) by mouth 2 (two) times a day as needed for anxiety    FLUoxetine (PROzac) 20 mg capsule Take 1 capsule (20 mg total) by mouth daily    Multiple Vitamins-Minerals (MULTIVITAMIN ADULTS) TABS Take 1 tablet by mouth daily     No current facility-administered medications on file prior to visit  She is allergic to azithromycin and penicillins       Review of Systems   Constitutional: Negative for activity change, appetite change, fatigue and unexpected weight change  Eyes: Negative for visual disturbance  Respiratory: Negative for cough, chest tightness, shortness of breath and wheezing  Cardiovascular: Negative for chest pain, palpitations and leg swelling  Breast: Patient denies tenderness, nipple discharge, masses, or erythema  Gastrointestinal: Negative for abdominal distention, abdominal pain, blood in stool, constipation, diarrhea, nausea and vomiting  Endocrine: Negative for cold intolerance and heat intolerance  Genitourinary: Negative for decreased urine volume, difficulty urinating, dysuria, frequency, hematuria, menstrual problem, pelvic pain, urgency, vaginal bleeding, vaginal discharge and vaginal pain  Musculoskeletal: Negative for arthralgias  Skin: Negative for rash  Neurological: Negative for weakness, light-headedness, numbness and headaches  Hematological: Does not bruise/bleed easily  Psychiatric/Behavioral: Negative for agitation, behavioral problems and sleep disturbance  The patient is not nervous/anxious  Objective:    Vitals:    10/04/22 1405   BP: 106/76   BP Location: Left arm   Patient Position: Sitting   Cuff Size: Standard   Weight: 59 2 kg (130 lb 9 6 oz)   Height: 5' 5" (1 651 m)            Physical Exam  Vitals and nursing note reviewed  Constitutional:       General: She is not in acute distress  Appearance: She is well-developed  HENT:      Head: Normocephalic and atraumatic  Eyes:      General: No scleral icterus  Right eye: No discharge  Left eye: No discharge  Extraocular Movements: Extraocular movements intact  Conjunctiva/sclera: Conjunctivae normal    Neck:      Thyroid: No thyromegaly  Trachea: No tracheal deviation  Cardiovascular:      Rate and Rhythm: Normal rate and regular rhythm  Heart sounds: Normal heart sounds  No murmur heard  Pulmonary:      Effort: Pulmonary effort is normal  No respiratory distress  Breath sounds: Normal breath sounds  No wheezing  Chest:   Breasts: Breasts are symmetrical       Right: No inverted nipple, mass, nipple discharge, skin change or tenderness  Left: No inverted nipple, mass, nipple discharge, skin change or tenderness  Abdominal:      General: Bowel sounds are normal  There is no distension  Palpations: Abdomen is soft  There is no mass  Tenderness: There is no abdominal tenderness  There is no guarding or rebound  Genitourinary:     General: Normal vulva  Labia:         Right: No rash, tenderness or lesion  Left: No rash, tenderness or lesion  Vagina: Normal       Cervix: No cervical motion tenderness or discharge  Uterus: Not deviated, not enlarged and not tender  Adnexa:         Right: No mass, tenderness or fullness  Left: No mass, tenderness or fullness  Rectum: No external hemorrhoid  Comments: Urethral meatus within normal limits  Perineum within normal limits  Bladder well supported    Physiologic vaginal atrophy  Musculoskeletal:         General: No tenderness  Normal range of motion  Cervical back: Normal range of motion and neck supple  Lymphadenopathy:      Cervical: No cervical adenopathy  Skin:     General: Skin is warm and dry  Neurological:      Mental Status: She is alert and oriented to person, place, and time  Psychiatric:         Mood and Affect: Mood normal          Behavior: Behavior normal          Thought Content:  Thought content normal          Judgment: Judgment normal

## 2022-11-26 ENCOUNTER — APPOINTMENT (OUTPATIENT)
Dept: LAB | Facility: MEDICAL CENTER | Age: 62
End: 2022-11-26

## 2022-11-26 DIAGNOSIS — Z00.00 ANNUAL PHYSICAL EXAM: ICD-10-CM

## 2022-11-26 LAB
ALBUMIN SERPL BCP-MCNC: 3.3 G/DL (ref 3.5–5)
ALP SERPL-CCNC: 61 U/L (ref 46–116)
ALT SERPL W P-5'-P-CCNC: 25 U/L (ref 12–78)
ANION GAP SERPL CALCULATED.3IONS-SCNC: 4 MMOL/L (ref 4–13)
AST SERPL W P-5'-P-CCNC: 19 U/L (ref 5–45)
BILIRUB SERPL-MCNC: 0.41 MG/DL (ref 0.2–1)
BUN SERPL-MCNC: 26 MG/DL (ref 5–25)
CALCIUM ALBUM COR SERPL-MCNC: 9.4 MG/DL (ref 8.3–10.1)
CALCIUM SERPL-MCNC: 8.8 MG/DL (ref 8.3–10.1)
CHLORIDE SERPL-SCNC: 110 MMOL/L (ref 96–108)
CHOLEST SERPL-MCNC: 189 MG/DL
CO2 SERPL-SCNC: 27 MMOL/L (ref 21–32)
CREAT SERPL-MCNC: 1.07 MG/DL (ref 0.6–1.3)
EST. AVERAGE GLUCOSE BLD GHB EST-MCNC: 108 MG/DL
GFR SERPL CREATININE-BSD FRML MDRD: 54 ML/MIN/1.73SQ M
GLUCOSE P FAST SERPL-MCNC: 92 MG/DL (ref 65–99)
HBA1C MFR BLD: 5.4 %
HDLC SERPL-MCNC: 89 MG/DL
LDLC SERPL CALC-MCNC: 92 MG/DL (ref 0–100)
POTASSIUM SERPL-SCNC: 4.1 MMOL/L (ref 3.5–5.3)
PROT SERPL-MCNC: 7 G/DL (ref 6.4–8.4)
SODIUM SERPL-SCNC: 141 MMOL/L (ref 135–147)
TRIGL SERPL-MCNC: 38 MG/DL

## 2022-12-27 ENCOUNTER — OFFICE VISIT (OUTPATIENT)
Dept: FAMILY MEDICINE CLINIC | Facility: CLINIC | Age: 62
End: 2022-12-27

## 2022-12-27 ENCOUNTER — APPOINTMENT (OUTPATIENT)
Dept: RADIOLOGY | Facility: MEDICAL CENTER | Age: 62
End: 2022-12-27

## 2022-12-27 VITALS
HEIGHT: 65 IN | SYSTOLIC BLOOD PRESSURE: 122 MMHG | OXYGEN SATURATION: 100 % | TEMPERATURE: 97.9 F | DIASTOLIC BLOOD PRESSURE: 82 MMHG | HEART RATE: 56 BPM | BODY MASS INDEX: 21.76 KG/M2 | WEIGHT: 130.6 LBS

## 2022-12-27 DIAGNOSIS — Z00.00 ANNUAL PHYSICAL EXAM: Primary | ICD-10-CM

## 2022-12-27 DIAGNOSIS — M25.559 HIP PAIN: ICD-10-CM

## 2022-12-27 DIAGNOSIS — M25.512 ACUTE PAIN OF LEFT SHOULDER: ICD-10-CM

## 2022-12-27 NOTE — PROGRESS NOTES
[address]    NAME: Vlad Ashton  AGE: 57 y o  SEX: female  : 1960     DATE: 2022     Assessment and Plan:     Problem List Items Addressed This Visit    None  Visit Diagnoses     Annual physical exam    -  Primary    Hip pain        Relevant Orders    XR hip/pelv 2-3 vws left if performed    Acute pain of left shoulder        Relevant Orders    XR shoulder 2+ vw left        Immunizations and preventive care screenings were discussed with patient today  Appropriate education was printed on patient's after visit summary  Counseling:  Dental Health: discussed importance of regular tooth brushing, flossing, and dental visits  Exercise: the importance of regular exercise/physical activity was discussed  Recommend exercise 3-5 times per week for at least 30 minutes  Return in about 1 year (around 2023)  Chief Complaint:     Chief Complaint   Patient presents with   • Physical Exam      History of Present Illness:     Adult Annual Physical   Patient here for a comprehensive physical exam  The patient reports no problems  Diet and Physical Activity  Diet/Nutrition: well balanced diet and consuming 3-5 servings of fruits/vegetables daily  Exercise: walking, 3-4 times a week on average and 30-60 minutes on average  Depression Screening  PHQ-2/9 Depression Screening    Little interest or pleasure in doing things: 0 - not at all  Feeling down, depressed, or hopeless: 0 - not at all  PHQ-2 Score: 0  PHQ-2 Interpretation: Negative depression screen       General Health  Sleep: sleeps well and gets 7-8 hours of sleep on average  Hearing: normal - bilateral   Vision: no vision problems, goes for regular eye exams and wears glasses  Dental: regular dental visits, brushes teeth twice daily and flosses teeth occasionally         /GYN Health  Patient is: postmenopausal  Last menstrual period: unknown     Review of Systems:     Review of Systems   Constitutional: Negative for activity change, appetite change and unexpected weight change  HENT: Negative for dental problem, ear pain, hearing loss, nosebleeds, sneezing, sore throat, tinnitus and trouble swallowing  Eyes: Negative for visual disturbance  Respiratory: Negative for cough, chest tightness, shortness of breath and wheezing  Cardiovascular: Negative for chest pain, palpitations and leg swelling  Gastrointestinal: Negative for abdominal pain, constipation, diarrhea and nausea  Endocrine: Negative for polydipsia and polyuria  Genitourinary: Negative  Musculoskeletal: Positive for arthralgias and myalgias  Negative for back pain and neck pain  Skin: Negative for color change and rash  Allergic/Immunologic: Negative for environmental allergies  Neurological: Negative for dizziness, weakness, light-headedness and headaches  Hematological: Negative  Psychiatric/Behavioral: Negative  Negative for dysphoric mood and sleep disturbance  The patient is not nervous/anxious  Past Medical History:     Past Medical History:   Diagnosis Date   • Fractures       Past Surgical History:     Past Surgical History:   Procedure Laterality Date   • BREAST CYST EXCISION Left 1979    fibroid tumor removed, 1979   • BREAST SURGERY Left     Removal of fibroid tumor   Resolved: 1980   • CATARACT EXTRACTION      Last assessed: 10/11/16   • DIAGNOSTIC LAPAROSCOPY  1982   • ENDOMETRIAL BIOPSY  1982    w/o cervical dilation   • FL INJECTION RIGHT SHOULDER (ARTHROGRAM)  9/28/2018   • TONSILLECTOMY     • WISDOM TOOTH EXTRACTION      Resolved: 1977      Social History:     Social History     Socioeconomic History   • Marital status: /Civil Union     Spouse name: None   • Number of children: 1   • Years of education: None   • Highest education level: None   Occupational History   • Occupation: Full-time employment   Tobacco Use   • Smoking status: Never   • Smokeless tobacco: Never   Vaping Use   • Vaping Use: Never used   Substance and Sexual Activity   • Alcohol use: Yes     Comment: Acute   • Drug use: No   • Sexual activity: Not Currently     Partners: Male   Other Topics Concern   • None   Social History Narrative    Daily caffeine consumption: 2-3 servings a day    Exercises 1 to 2 times per week     Social Determinants of Health     Financial Resource Strain: Not on file   Food Insecurity: Not on file   Transportation Needs: Not on file   Physical Activity: Not on file   Stress: Not on file   Social Connections: Not on file   Intimate Partner Violence: Not on file   Housing Stability: Not on file      Family History:     Family History   Problem Relation Age of Onset   • Diabetes Mother         Mellitus   • Stroke Mother         CVA   • Osteoporosis Mother    • Hypertension Mother    • Hypertension Father    • Diabetes Maternal Grandfather         Mellitus   • Heart attack Paternal Grandfather         Myocardial Infarction   • Breast cancer Paternal Aunt 46   • No Known Problems Sister    • No Known Problems Daughter    • Breast cancer Paternal Aunt 48   • No Known Problems Brother       Current Medications:     Current Outpatient Medications   Medication Sig Dispense Refill   • ALPRAZolam (XANAX) 0 25 mg tablet Take 1 tablet (0 25 mg total) by mouth 2 (two) times a day as needed for anxiety 180 tablet 0   • FLUoxetine (PROzac) 20 mg capsule Take 1 capsule (20 mg total) by mouth daily 90 capsule 3   • Multiple Vitamins-Minerals (MULTIVITAMIN ADULTS) TABS Take 1 tablet by mouth daily       No current facility-administered medications for this visit  Allergies:      Allergies   Allergen Reactions   • Azithromycin Hives     Reaction Date: 03Aug2011;    • Penicillins Rash      Physical Exam:     /82   Pulse 56   Temp 97 9 °F (36 6 °C)   Ht 5' 5" (1 651 m)   Wt 59 2 kg (130 lb 9 6 oz)   LMP  (LMP Unknown)   SpO2 100%   BMI 21 73 kg/m² (Reviewed)    Physical Exam  Vitals reviewed  Constitutional:       General: She is not in acute distress  Appearance: Normal appearance  She is well-developed and well-groomed  She is not ill-appearing  HENT:      Head: Normocephalic and atraumatic  Right Ear: Tympanic membrane, ear canal and external ear normal       Left Ear: Tympanic membrane, ear canal and external ear normal       Nose: Nose normal       Mouth/Throat:      Lips: Pink  Mouth: Mucous membranes are moist       Pharynx: Oropharynx is clear  Eyes:      General: Lids are normal       Extraocular Movements: Extraocular movements intact  Conjunctiva/sclera: Conjunctivae normal       Pupils: Pupils are equal, round, and reactive to light  Neck:      Thyroid: No thyromegaly  Trachea: Trachea normal    Cardiovascular:      Rate and Rhythm: Normal rate and regular rhythm  Pulses: Normal pulses  Radial pulses are 2+ on the right side and 2+ on the left side  Dorsalis pedis pulses are 2+ on the right side and 2+ on the left side  Posterior tibial pulses are 2+ on the right side and 2+ on the left side  Heart sounds: Normal heart sounds  No murmur heard  Pulmonary:      Effort: Pulmonary effort is normal       Breath sounds: Normal breath sounds  Abdominal:      General: Abdomen is flat  Bowel sounds are normal       Palpations: Abdomen is soft  Tenderness: There is no abdominal tenderness  Musculoskeletal:         General: Tenderness present  Cervical back: Normal range of motion and neck supple  Right lower leg: No edema  Left lower leg: No edema  Lymphadenopathy:      Cervical: No cervical adenopathy  Skin:     General: Skin is warm and dry  Capillary Refill: Capillary refill takes less than 2 seconds  Neurological:      Mental Status: She is alert and oriented to person, place, and time  Motor: Motor function is intact     Psychiatric: Mood and Affect: Mood normal          Behavior: Behavior normal  Behavior is cooperative            Ada

## 2022-12-27 NOTE — PATIENT INSTRUCTIONS

## 2022-12-30 DIAGNOSIS — M25.552 LEFT HIP PAIN: ICD-10-CM

## 2022-12-30 DIAGNOSIS — M25.512 ACUTE PAIN OF LEFT SHOULDER: Primary | ICD-10-CM

## 2023-01-09 ENCOUNTER — EVALUATION (OUTPATIENT)
Dept: PHYSICAL THERAPY | Facility: CLINIC | Age: 63
End: 2023-01-09

## 2023-01-09 DIAGNOSIS — M25.512 ACUTE PAIN OF LEFT SHOULDER: Primary | ICD-10-CM

## 2023-01-09 NOTE — PROGRESS NOTES
PT EVALUATION    Today's date: 23  Patient name: Rolfe Holstein  : 1960  MRN: [id number]  Referring provider: ALLEN Gallegos  Dx:   1  Acute pain of left shoulder        ASSESSMENT:  Rolfe Holstein is a 57 y o  female who presents symptoms consistent of chronic left shoulder pain  Patient symptoms seem to be consistent with a bicep tendinitis and RTC irritation contributing to anterior shoulder pain  Patient does show equal mobility deficits with both PROM and AROM that is mostly limited by pain  Patient does have some postural dysfunction component with rounded shoulders which contributes to anterior discomfort  Unable to assess proper strength this date secondary to high levels of pain  Overall, patient presents with pain, decreased strength, decreased ROM and decreased joint mobility  Due to these impairments, Patient has difficulty performing a/iadls, recreational activities and work-related activities  At this time not ruling out potential start of adhesive capsulitis secondary to previous diagnosis on RUE  Patient would benefit from skilled physical therapy to address the impairments, improve their level of function, and to improve their overall quality of life  Impairments:    restricted ROM    decreased strength   pain with function   activity intolerance   Postural dysfunction   scapular dyskinesis     Prognosis:  Good  Positive and negative prognostic indicator(s):  none    Goals:    Short Term Goals: to be achieved by 4 weeks  1) Patient to be independent with basic HEP  2) Decrease pain to 4 and 5/10 at its worst   3) Increase shoulder ROM by 5-10 degrees in all planes  4) Increase shoulder strength by 1/2 MMT grade in all deficient planes  Long Term Goals: to be achieved by discharge  1) FOTO equal to or greater than target score indicating improvements with overall function  2) Patient to be independent with comprehensive HEP    3) Patient will demonstrate maximal over head reaching  4) Patient to report no sleep interruption secondary to pain  Planned interventions:  home exercise program, patient education, manual therapy, graded activity, flexibility, functional range of motion exercises, strengthening, low level laser with IASTM and modalities prn    Duration in visits:  4  Frequency: 1 visits per week  Duration in weeks:  4    History of Current Injury: Patient notes that about 6 months ago she started to have left shoulder pain that would radiate into the upper arm  Patient notes that within the last month the pain has gotten progressively worse and the range has lessened  Patient notes that she had right frozen shoulder and it felt about the same  Patient notes that the pain can get so severe that when she gets it it can take her breath away and bring her to her knees  Patient notes that she tried to remember what exercises she did before for the right should but couldn't remember and then just let it go for a while and not its very bothersome  Patient notes that the pain is constant with a dull ache and then a very sharp pain when she moves it wrong  Pain location: anterior shoulder pain that will radiate into the upper arm  (some UT pain now as well)   Pain descriptors: dull ache to sharp   Pain at Currently:  1/10   Pain at Best: 1/10   Pain at Worst:  8/10     Aggravating factors: reaching behind back, pulling shirt out of her sleeves, reaching for shelf, sleeping on her left side, reaching out to the side  Easing factors: Advil and tylenol combo with some relief    Imaging: x-ray unremarkable see imaging tab   Special Questions: Patient denies numbness, tingling, burning sensations  Denies significant decrease in strength  Hobbies/Interests: Loves to read and walking     Occupation: [employer] ent goals:  Patient reports goals for physical therapy would be decreased pain, independence with ADLs and increased strength       Objective Postural Observations  Seated posture: fair  Standing posture: fair    Additional Postural Observation Details  Rounded shoulders     Palpation   Left   Hypertonic in the upper trapezius  Tenderness of the upper trapezius  Trigger point to upper trapezius  Tenderness     Left Shoulder   Tenderness in the biceps tendon (proximal) and supraspinatus tendon  Cervical/Thoracic Screen   Cervical range of motion within normal limits  Thoracic range of motion within normal limits with the following exceptions: Mild limitations in all planes     Neurological Testing     Sensation     Shoulder   Left Shoulder   Intact: light touch    Right Shoulder   Intact: light touch    Active Range of Motion   Left Shoulder   Flexion: 85 degrees with pain  Extension: 30 degrees with pain  Abduction: 80 degrees with pain  External rotation 0°: 35 degrees   External rotation 45°: 20 degrees with pain  Internal rotation 0°: WFL and with pain  Internal rotation 45°: 25 degrees with pain    Strength/Myotome Testing     Left Shoulder     Planes of Motion   Flexion: 3+   Extension: 3+   Abduction: 3+   External rotation at 0°: 3+   Internal rotation at 0°: 3+     Isolated Muscles   Lower trapezius: 4-   Middle trapezius: 4-   Rhomboids: 4-     Right Shoulder   Normal muscle strength    Additional Strength Details  Strength limited by pain  Tests     Left Shoulder   Positive belly press, empty can and Speed's             Precautions:   Manuals             AP mobs              LLL             Shoulder stretching all planes                           Neuro Re-Ed             Prone rows              Prone Ts                                                                              Ther Ex             Resisted rows              Resisted extensions             Thoracic extensions             Seated scapular depression retraction holds EOB             pec stretch              pball wall rolls              Sidelying ER Arm bike retro              Ther Activity                                       Gait Training                                       Modalities

## 2023-01-11 ENCOUNTER — HOSPITAL ENCOUNTER (OUTPATIENT)
Dept: RADIOLOGY | Age: 63
Discharge: HOME | End: 2023-01-11

## 2023-01-11 VITALS — WEIGHT: 130 LBS | BODY MASS INDEX: 21.66 KG/M2 | HEIGHT: 65 IN

## 2023-01-11 DIAGNOSIS — Z12.31 ENCOUNTER FOR SCREENING MAMMOGRAM FOR MALIGNANT NEOPLASM OF BREAST: ICD-10-CM

## 2023-01-16 ENCOUNTER — OFFICE VISIT (OUTPATIENT)
Dept: PHYSICAL THERAPY | Facility: CLINIC | Age: 63
End: 2023-01-16

## 2023-01-16 DIAGNOSIS — M25.512 ACUTE PAIN OF LEFT SHOULDER: Primary | ICD-10-CM

## 2023-01-16 NOTE — PROGRESS NOTES
Daily Note     Today's date: 2023  Patient name: Tavia Maciel  : 1960  MRN: [id number]  Referring provider: ALLEN Ibanez  Dx:   Encounter Diagnosis     ICD-10-CM    1  Acute pain of left shoulder  M25 512           Start Time: 1530  Stop Time: 161  Total time in clinic (min): 45 minutes    Subjective: Patient reports performing exercises religiously  Patient notes discomfort with them but no flare ups  Patient reports no improvements in pain yet  Patient notes that she feels like it might be getting more stiff  Objective: See treatment diary below      Assessment: Patient tolerated treatment fair  Patient responded well to the introduction of exercise program this date  Moderate pain throughout session  Focused on mobility secondary to ROM deficits and levels of pain this date  Discussed that with decreased ROM and high levels of pain with all planes it seems it may be consistent with adhesive capsulitis  Will contunue to monitor motion appropriately  Unable to get to joint end range secondary to pain levels  Patient limited in ER>IR>FLEX>ABD  Patient continues to have range of motion deficits with pain  Updated HEP this date  Patient would benefit from continued PT      Plan: Continue per plan of care        Precautions:   Manuals             AP mobs              LLL             Shoulder stretching all planes  ACL    Gentle stretching                          Neuro Re-Ed             Prone rows              Prone Ts                                                                              Ther Ex             Resisted rows              Supine forward flexion AAROM with cane  10x             ER table stretch  :10x10            scaption table slides  :10x10            Forward table slides :10x10            pball forward flexionl rolls  :10x10             thoracic extensions :10x10            Arm bike retro  DC            Ther Activity                                       Gait Training                                       Modalities

## 2023-01-23 ENCOUNTER — OFFICE VISIT (OUTPATIENT)
Dept: PHYSICAL THERAPY | Facility: CLINIC | Age: 63
End: 2023-01-23

## 2023-01-23 DIAGNOSIS — M25.512 ACUTE PAIN OF LEFT SHOULDER: Primary | ICD-10-CM

## 2023-01-23 NOTE — PROGRESS NOTES
Daily Note     Today's date: 2023  Patient name: Idalia Theodore  : 1960  MRN: [id number]  Referring provider: ALLEN Rodriguez  Dx:   Encounter Diagnosis     ICD-10-CM    1  Acute pain of left shoulder  M25 512                      Subjective: Patient reports mild soreness after last visit  She notes continued L shoulder pain which is worse when donning clothes, doing her hair, and she is unable to lay on L side  Objective: See treatment diary below      Assessment: Focused on L shoulder mobility as indicated and patient tolerated treatment well  Patient demonstrated fatigue post treatment, exhibited good technique with therapeutic exercises and would benefit from continued PT  Continued PROM restrictions into all planes with increased pain and muscle guarding during light manual stretching  Initiated cane ER and extension AAROM as charted  Plan: Progress treatment as tolerated         Precautions:   Manuals            AP mobs              LLL             Shoulder stretching all planes  ACL    Gentle stretching                          Neuro Re-Ed             Prone rows              Prone Ts                                                    Cane ER supine  :10x10           Cane ext  10x           Ther Ex             Resisted rows              Supine forward flexion AAROM with cane  10x  :10x10           ER table stretch  :10x10 :10x10           scaption table slides  :10x10 :10x10           Forward table slides :10x10 :10x10           pball forward flexionl rolls  :10x10 :10x10            thoracic extensions :10x10 :10x10           Arm bike retro  DC            Ther Activity                                       Gait Training                                       Modalities

## 2023-01-30 ENCOUNTER — OFFICE VISIT (OUTPATIENT)
Dept: PHYSICAL THERAPY | Facility: CLINIC | Age: 63
End: 2023-01-30

## 2023-01-30 DIAGNOSIS — M25.512 ACUTE PAIN OF LEFT SHOULDER: Primary | ICD-10-CM

## 2023-01-30 NOTE — PROGRESS NOTES
Daily Note     Today's date: 2023  Patient name: Danial Triplett  : 1960  MRN: [id number]  Referring provider: ALLEN Velez  Dx:   Encounter Diagnosis     ICD-10-CM    1  Acute pain of left shoulder  M25 512                      Subjective: Patient reports she felt okay after last visit  She continues to be unable to lay on left side in bed and notes intermittent pain even at rest       Objective: See treatment diary below      Assessment: Tolerated treatment well  Patient exhibited good technique with therapeutic exercises and would benefit from continued PT  PROM is progressing slowly with pain at end range in all planes; ER/IR continues to be most restricted  Plan: Progress treatment as tolerated         Precautions:   Manuals           AP mobs              LLL             Shoulder stretching all planes  ACL    Gentle stretching   MC                       Neuro Re-Ed             Prone rows              Prone Ts                                                    Cane ER supine  :10x10 :10x10          Cane ext  10x 10x          Ther Ex             Resisted rows              Supine forward flexion AAROM with cane  10x  :10x10 :10x10          ER table stretch  :10x10 :10x10 :10x10          scaption table slides  :10x10 :10x10 :10x10          Forward table slides :10x10 :10x10 :10x10          pball forward flexionl rolls  :10x10 :10x10 :10x10           thoracic extensions :10x10 :10x10 :10x10          Arm bike retro  DC            Ther Activity                                       Gait Training                                       Modalities

## 2023-02-06 ENCOUNTER — OFFICE VISIT (OUTPATIENT)
Dept: PHYSICAL THERAPY | Facility: CLINIC | Age: 63
End: 2023-02-06

## 2023-02-06 DIAGNOSIS — M25.512 ACUTE PAIN OF LEFT SHOULDER: Primary | ICD-10-CM

## 2023-02-06 NOTE — PROGRESS NOTES
Daily Note     Today's date: 2023  Patient name: Gus Alvarado  : 1960  MRN: [id number]  Referring provider: ALLEN Maldonado  Dx:   Encounter Diagnosis     ICD-10-CM    1  Acute pain of left shoulder  M25 512                      Subjective: Patient reports some soreness after last session and continued L shoulder pain with HEP, worse with rotational movements  She continues to try to do ADLs  Objective: See treatment diary below      Assessment: Tolerated treatment well  Patient demonstrated fatigue post treatment, exhibited good technique with therapeutic exercises and would benefit from continued PT  Increased stiffness into passive flexion initially which resolved as reps increased  Continued pain with ER>IR manual stretching though PROM is progressing slowly  Patient continues to feel significant tension with established exercise program with pain during ER and scaption activities  Plan: Progress treatment as tolerated         Precautions:   Manuals  2/6         AP mobs              LLL             Shoulder stretching all planes  ACL    Gentle stretching  MC MC MC                      Neuro Re-Ed             Prone rows              Prone Ts                                                    Cane ER supine  :10x10 :10x10 :10x10         Cane ext  10x 10x 2x10         Ther Ex             Resisted rows              Supine forward flexion AAROM with cane  10x  :10x10 :10x10 :10x10         ER table stretch  :10x10 :10x10 :10x10 :10x10         scaption table slides  :10x10 :10x10 :10x10 :10x10         Forward table slides :10x10 :10x10 :10x10 :10x10         pball forward flexionl rolls  :10x10 :10x10 :10x10 :10x10         pball scaption    :10x10          thoracic extensions :10x10 :10x10 :10x10 :10x10         Arm bike retro  DC            Ther Activity                                       Gait Training                                       Modalities

## 2023-02-13 ENCOUNTER — OFFICE VISIT (OUTPATIENT)
Dept: PHYSICAL THERAPY | Facility: CLINIC | Age: 63
End: 2023-02-13

## 2023-02-13 DIAGNOSIS — M25.512 ACUTE PAIN OF LEFT SHOULDER: Primary | ICD-10-CM

## 2023-02-13 NOTE — PROGRESS NOTES
Daily Note     Today's date: 2023  Patient name: Celsa Oviedo  : 1960  MRN: [id number]  Referring provider: ALLEN Del Toro  Dx:   Encounter Diagnosis     ICD-10-CM    1  Acute pain of left shoulder  M25 512           Start Time: 1530  Stop Time: 161  Total time in clinic (min): 45 minutes    Subjective: Patient reports that she has continued L shoulder pain that is debilitating with normal daily activities  Patient notes that she feels like she is making no progress because of the continued pain and is getting frustrated  Objective: See treatment diary below      Assessment: Patient tolerated treatment well  Muscle guarding throughout manual stretching this date  Patient limited and tight into all planes of motion however the worst into IR>ER  Patient continues to have discomfort at end range  Discussed with patient that it may be beneficial to make an appointment  with ortho for further assessment and see what other treatment options are  Patient is demonstrating limited ROM which is consistent with adhesive capsulitis  Discussed with patient the nature of the prognosis and the expected timeline for recovery  Patient demonstrated fatigue post treatment, exhibited good technique with therapeutic exercises and would benefit from continued PT  Plan: Progress treatment as tolerated         Precautions:   Manuals         AP mobs              LLL             Shoulder stretching all planes  ACL    Gentle stretching  MC MC MC ACL                     Neuro Re-Ed             Prone rows              Prone Ts                                                    Cane ER supine  :10x10 :10x10 :10x10 :10x10        Cane ext  10x 10x 2x10 2x10        Ther Ex             Resisted rows              Supine forward flexion AAROM with cane  10x  :10x10 :10x10 :10x10 :10x10        ER table stretch  :10x10 :10x10 :10x10 :10x10 :10x10        scaption table slides  :10x10 :10x10 :10x10 :10x10 :10x10        Forward table slides :10x10 :10x10 :10x10 :10x10 :10x10        pball forward flexionl rolls  :10x10 :10x10 :10x10 :10x10 :10x10        pball scaption    :10x10 :10x10         thoracic extensions :10x10 :10x10 :10x10 :10x10 :10x10        Arm bike retro  DC            Ther Activity                                       Gait Training                                       Modalities

## 2023-02-15 DIAGNOSIS — F41.9 ANXIETY: ICD-10-CM

## 2023-02-15 RX ORDER — ALPRAZOLAM 0.25 MG/1
0.25 TABLET ORAL 2 TIMES DAILY PRN
Qty: 180 TABLET | Refills: 0 | Status: SHIPPED | OUTPATIENT
Start: 2023-02-15

## 2023-02-15 NOTE — TELEPHONE ENCOUNTER
[id number] 08/22/2022 08/22/2022 ALPRAZolam (Tablet)  180 0 90 0 25 MG NA ALMA CORTÉS  [facility]  [employer] 0 / 0 [place]     1  [id number] 02/24/2022 02/24/2022 ALPRAZolam (Tablet)  180 0 90 0 25 MG NA CANELO COFFMAN  [facility]  Cesar Larson (R) 0 / 0 [place]

## 2023-02-20 ENCOUNTER — TELEPHONE (OUTPATIENT)
Dept: OBGYN CLINIC | Facility: CLINIC | Age: 63
End: 2023-02-20

## 2023-02-20 ENCOUNTER — OFFICE VISIT (OUTPATIENT)
Dept: PHYSICAL THERAPY | Facility: CLINIC | Age: 63
End: 2023-02-20

## 2023-02-20 DIAGNOSIS — M25.512 ACUTE PAIN OF LEFT SHOULDER: Primary | ICD-10-CM

## 2023-02-20 NOTE — TELEPHONE ENCOUNTER
Called patient and left message to reschedule appt for 2/22 due to Dr Majorie Rubinstein having to go into surgery

## 2023-02-20 NOTE — PROGRESS NOTES
Daily Note     Today's date: 2023  Patient name: Adam Garcia  : 1960  MRN: [id number]  Referring provider: ALLEN Steinberg  Dx:   Encounter Diagnosis     ICD-10-CM    1  Acute pain of left shoulder  M25 512           Start Time:   Stop Time:   Total time in clinic (min): 45 minutes    Subjective: Patient reports no new changes and continues to have constant ache with rest and worsened by movement  Patient notes that she goes and see an ortho on Thursday  Objective: See treatment diary below      Assessment: Patient tolerated treatment well  Continued with mobility exercises secondary to pain levels and joint restrictions  ROM deficits in all planes  Initiate low level laser for pain management this date  Will see patient next week after ortho appointment and will continue as able  Patient demonstrated fatigue post treatment, exhibited good technique with therapeutic exercises and would benefit from continued PT  Plan: Progress treatment as tolerated         Precautions:   Manuals         AP mobs              LLL             Shoulder stretching all planes  ACL    Gentle stretching  MC MC MC ACL ACL       LLL       ACL       Neuro Re-Ed             Prone rows              Prone Ts             Scapular retractions sitting EOB             Pendulums       FWD/BWD  1 min                    Cane ER supine  :10x10 :10x10 :10x10 :10x10 :10x10       Cane ext  10x 10x 2x10 2x10 2x10       Ther Ex             Resisted rows              Supine forward flexion AAROM with cane  10x  :10x10 :10x10 :10x10 :10x10 :10x10       ER table stretch  :10x10 :10x10 :10x10 :10x10 :10x10 :10x10       scaption table slides  :10x10 :10x10 :10x10 :10x10 :10x10 :10x10       Forward table slides :10x10 :10x10 :10x10 :10x10 :10x10 :10x10 with incline       pball forward flexionl rolls  :10x10 :10x10 :10x10 :10x10 :10x10 :10x10       pball scaption    :10x10 :10x10 :10x10 thoracic extensions :10x10 :10x10 :10x10 :10x10 :10x10 :10x10       Arm bike retro  DC            Ther Activity                                       Gait Training                                       Modalities

## 2023-02-23 ENCOUNTER — OFFICE VISIT (OUTPATIENT)
Dept: OBGYN CLINIC | Facility: CLINIC | Age: 63
End: 2023-02-23

## 2023-02-23 VITALS
BODY MASS INDEX: 21.66 KG/M2 | OXYGEN SATURATION: 100 % | DIASTOLIC BLOOD PRESSURE: 70 MMHG | SYSTOLIC BLOOD PRESSURE: 110 MMHG | HEART RATE: 70 BPM | HEIGHT: 65 IN | WEIGHT: 130 LBS

## 2023-02-23 DIAGNOSIS — M75.02 ADHESIVE CAPSULITIS OF LEFT SHOULDER: Primary | ICD-10-CM

## 2023-02-23 RX ORDER — TRIAMCINOLONE ACETONIDE 40 MG/ML
40 INJECTION, SUSPENSION INTRA-ARTICULAR; INTRAMUSCULAR
Status: COMPLETED | OUTPATIENT
Start: 2023-02-23 | End: 2023-02-23

## 2023-02-23 RX ADMIN — TRIAMCINOLONE ACETONIDE 40 MG: 40 INJECTION, SUSPENSION INTRA-ARTICULAR; INTRAMUSCULAR at 14:58

## 2023-02-23 NOTE — PROGRESS NOTES
[address] [address]  [address]   [id number]  1960    ORTHOPAEDIC SURGERY OUTPATIENT NOTE  2/23/2023      HISTORY:  57 y o  female  Presents for initial evaluation of her left shoulder  She reports atraumatic insidious onset of pain in May of 2022 with worse pain starting in December  She reports a history of frozen shoulder on the opposite side several years ago  She has been doing physical therapy for the past 2 months or so  She has had 8 visits  She feels like she has reached a point with therapy where she is not progressing with her range of motion and pain  She has taken Advil  She has not had any injections  No diabetes  Past Medical History:   Diagnosis Date   • Fractures        Past Surgical History:   Procedure Laterality Date   • BREAST CYST EXCISION Left 1979    fibroid tumor removed, 1979   • BREAST SURGERY Left     Removal of fibroid tumor   Resolved: 1980   • CATARACT EXTRACTION      Last assessed: 10/11/16   • DIAGNOSTIC LAPAROSCOPY  1982   • ENDOMETRIAL BIOPSY  1982    w/o cervical dilation   • FL INJECTION RIGHT SHOULDER (ARTHROGRAM)  9/28/2018   • TONSILLECTOMY     • WISDOM TOOTH EXTRACTION      Resolved: 1977       Social History     Socioeconomic History   • Marital status: /Civil Union     Spouse name: Not on file   • Number of children: 1   • Years of education: Not on file   • Highest education level: Not on file   Occupational History   • Occupation: Full-time employment   Tobacco Use   • Smoking status: Never   • Smokeless tobacco: Never   Vaping Use   • Vaping Use: Never used   Substance and Sexual Activity   • Alcohol use: Yes     Comment: Acute   • Drug use: No   • Sexual activity: Not Currently     Partners: Male   Other Topics Concern   • Not on file   Social History Narrative    Daily caffeine consumption: 2-3 servings a day Exercises 1 to 2 times per week     Social Determinants of Health     Financial Resource Strain: Not on file   Food Insecurity: Not on file   Transportation Needs: Not on file   Physical Activity: Not on file   Stress: Not on file   Social Connections: Not on file   Intimate Partner Violence: Not on file   Housing Stability: Not on file       Family History   Problem Relation Age of Onset   • Diabetes Mother         Mellitus   • Stroke Mother         CVA   • Osteoporosis Mother    • Hypertension Mother    • Hypertension Father    • Diabetes Maternal Grandfather         Mellitus   • Heart attack Paternal Grandfather         Myocardial Infarction   • Breast cancer Paternal Aunt 46   • No Known Problems Sister    • No Known Problems Daughter    • Breast cancer Paternal Aunt 46   • No Known Problems Brother         Patient's Medications   New Prescriptions    No medications on file   Previous Medications    ALPRAZOLAM (XANAX) 0 25 MG TABLET    Take 1 tablet (0 25 mg total) by mouth 2 (two) times a day as needed for anxiety    FLUOXETINE (PROZAC) 20 MG CAPSULE    Take 1 capsule (20 mg total) by mouth daily    MULTIPLE VITAMINS-MINERALS (MULTIVITAMIN ADULTS) TABS    Take 1 tablet by mouth daily   Modified Medications    No medications on file   Discontinued Medications    No medications on file       Allergies   Allergen Reactions   • Azithromycin Hives     Reaction Date: 03Aug2011;    • Penicillins Rash        /70 (BP Location: Right arm, Patient Position: Sitting, Cuff Size: Standard)   Pulse 70   Ht 5' 5" (1 651 m)   Wt 59 kg (130 lb)   LMP  (LMP Unknown)   SpO2 100%   BMI 21 63 kg/m²      REVIEW OF SYSTEMS:  Constitutional: Negative  HEENT: Negative  Respiratory: Negative  Skin: Negative  Neurological: Negative  Psychiatric/Behavioral: Negative  Musculoskeletal: Negative except for that mentioned in the HPI      PHYSICAL EXAM:     /70 (BP Location: Right arm, Patient Position: Sitting, Cuff Size: Standard)   Pulse 70   Ht 5' 5" (1 651 m)   Wt 59 kg (130 lb)   LMP  (LMP Unknown)   SpO2 100%   BMI 21 63 kg/m²   Gen: No acute distress, resting comfortably in bed  HEENT: Eyes clear, moist mucus membranes, hearing intact  Respiratory: No audible wheezing or stridor  Cardiovascular: Well Perfused peripherally, 2+ distal pulse  Abdomen: nondistended, no peritoneal signs    LEFT SHOULDER:     NVI axillary/medial/radial/ulnar and sensory intact     Forward flexion:   110 degrees active  Abduction:  50 degrees   External rotation at 0 degrees:   40 degrees   Internal rotation: Sacrum     STRENGTH:  Forward flexion:  5/5   Abduction:  5/5   External rotation:  5/5   Internal rotation:  5/5        Speed test: positive  Yergason's: positive  Tender to palpation ACJ (acromioclavicular joint): Negative   Tender to palpation LHB (long head of biceps): Negative  Dan test: Negative  Hinsdale test: Negative  Hornblower's: Negative  Lift off: Negative  Belly press: Negative  Bear hug: Negative  External lag sign: Negative  Cross-body adduction: Negative  Sulcus sign: Negative  Erin's test: Negative  Drop arm test: negative     RIGHT SHOULDER:     NVI axillary/medial/radial/ulnar and sensory intact     Forward flexion:   180 degrees   Abduction:  180 degrees   External rotation at 90 degrees abduction:  90 degrees   Internal rotation at 90 degrees abduction:   90 degrees   External rotation at 0 degrees:  70 degrees   Internal rotation: T7      STRENGTH:  Forward flexion:  5/5   Abduction:  5/5   External rotation:  5/5   Internal rotation:  5/5      Reflexes:  Brachioradialis:  Symmetric bilaterally  Triceps: Symmetric bilaterally  Biceps: Symmetric bilaterally  Patella tendon: Symmetric bilaterally  Achilles tendon: Symmetric bilaterally  Babinski's: Negative  Rashad sign: Negative     No scapular winging or dyskinesis     Capillary refill brisk   Full ROM of elbows bilaterally      Skin:  No ecchymosis/abrasion/erythema/warmth     Cervical spine:   Full rotation, side bending, flexion and extension without radiating pain  Spurling's: Negative    IMAGING:  XR of left shoulder available for review, independently interpreted demonstrates no acute fracture, no significant degenerative changes  ASSESSMENT AND PLAN: 57 y o  female with left shoulder adhesive capsulitis  We discussed with the patient conservative management with a long course of physical therapy and consider intra-articular steroid injection today  She elected to proceed with that and this was tolerated well  She will continue physical therapy for stretching and range of motion  We will see her back as needed  Large joint arthrocentesis: L glenohumeral  Universal Protocol:  Consent: Verbal consent obtained    Consent given by: patient  Patient identity confirmed: verbally with patient    Supporting Documentation  Indications: pain   Procedure Details  Location: shoulder - L glenohumeral  Needle size: 22 G  Ultrasound guidance: no  Approach: anterior  Medications administered: 40 mg triamcinolone acetonide 40 mg/mL    Patient tolerance: patient tolerated the procedure well with no immediate complications  Dressing:  Sterile dressing applied        Scribe Attestation      I,:  Azeb Melgoza PA-C am acting as a scribe while in the presence of the attending physician :       I,:  Kenan Mckeon personally performed the services described in this documentation    as scribed in my presence :

## 2023-02-28 ENCOUNTER — OFFICE VISIT (OUTPATIENT)
Dept: PHYSICAL THERAPY | Facility: CLINIC | Age: 63
End: 2023-02-28

## 2023-02-28 DIAGNOSIS — M25.512 ACUTE PAIN OF LEFT SHOULDER: Primary | ICD-10-CM

## 2023-02-28 NOTE — PROGRESS NOTES
Daily Note     Today's date: 2023  Patient name: Rosalind Diaz  : 1960  MRN: [id number]  Referring provider: ALLEN Crystal  Dx:   Encounter Diagnosis     ICD-10-CM    1  Acute pain of left shoulder  M25 512                      Subjective: Patient reports she made an appt with ortho and was given an injection in L shoulder  She states it didn't feel any different at first but she noticed less pain when donning shirt yesterday  Objective: See treatment diary below      Assessment: Tolerated treatment well  Patient exhibited good technique with therapeutic exercises and would benefit from continued PT  Improved tolerance to AAROM activities overall; able to tolerate seated scaption and ER up incline  Significant improvement in ER PROM which was near 80 degrees following manual stretching  Passive IR continues to be most restricted and painful  Plan: Progress treatment as tolerated         Precautions:   Manuals       AP mobs              LLL             Shoulder stretching all planes  ACL    Gentle stretching  MC MC MC ACL ACL MC      LLL       ACL       Neuro Re-Ed             Prone rows              Prone Ts             Scapular retractions sitting EOB             Pendulums       FWD/BWD  1 min                    Cane ER supine  :10x10 :10x10 :10x10 :10x10 :10x10 :10x10      Cane ext  10x 10x 2x10 2x10 2x10 2x10      Ther Ex             Resisted rows              Supine forward flexion AAROM with cane  10x  :10x10 :10x10 :10x10 :10x10 :10x10       ER table stretch  :10x10 :10x10 :10x10 :10x10 :10x10 :10x10 :10x10 /c incline      scaption table slides  :10x10 :10x10 :10x10 :10x10 :10x10 :10x10 :10x10 with incline      Forward table slides :10x10 :10x10 :10x10 :10x10 :10x10 :10x10 with incline :10x10 with incline      pball forward flexionl rolls  :10x10 :10x10 :10x10 :10x10 :10x10 :10x10 :10x10      pball scaption    :10x10 :10x10 :10x10 :10x10       thoracic extensions :10x10 :10x10 :10x10 :10x10 :10x10 :10x10 :10x10      Arm bike retro  DC            Ther Activity                                       Gait Training                                       Modalities

## 2023-03-06 ENCOUNTER — OFFICE VISIT (OUTPATIENT)
Dept: PHYSICAL THERAPY | Facility: CLINIC | Age: 63
End: 2023-03-06

## 2023-03-06 DIAGNOSIS — M25.512 ACUTE PAIN OF LEFT SHOULDER: Primary | ICD-10-CM

## 2023-03-06 NOTE — PROGRESS NOTES
Daily Note     Today's date: 3/6/2023  Patient name: Socorro Zamora  : 1960  MRN: [id number]  Referring provider: ALLEN Churchill  Dx:   Encounter Diagnosis     ICD-10-CM    1  Acute pain of left shoulder  M25 512                      Subjective: Patient reports her shoulder has been feeling a little better since getting her injection  Objective: See treatment diary below      Assessment: ROM limited in flexion and IR mainly, good tolerance for TE and PROM stretching, progress as pt is able to tolerate  Plan: Continue per plan of care        Precautions:   Manuals 1/16 1/23 1/30 2/6 2/13 2/20  2/28 3/6     AP mobs              LLL             Shoulder stretching all planes  ACL    Gentle stretching  MC MC MC ACL ACL MC HA     LLL       ACL  HA     Neuro Re-Ed             Prone rows              Prone Ts             Scapular retractions sitting EOB             Pendulums       FWD/BWD  1 min                    Cane ER supine  :10x10 :10x10 :10x10 :10x10 :10x10 :10x10 :10x10     Cane ext  10x 10x 2x10 2x10 2x10 2x10 2x10     Ther Ex             Resisted rows              Supine forward flexion AAROM with cane  10x  :10x10 :10x10 :10x10 :10x10 :10x10  :10x10     ER table stretch  :10x10 :10x10 :10x10 :10x10 :10x10 :10x10 :10x10 /c incline :10x10     scaption table slides  :10x10 :10x10 :10x10 :10x10 :10x10 :10x10 :10x10 with incline :10x10     Forward table slides :10x10 :10x10 :10x10 :10x10 :10x10 :10x10 with incline :10x10 with incline :10x10     pball forward flexionl rolls  :10x10 :10x10 :10x10 :10x10 :10x10 :10x10 :10x10 :10x10     pball scaption    :10x10 :10x10 :10x10 :10x10 :10x10      thoracic extensions :10x10 :10x10 :10x10 :10x10 :10x10 :10x10 :10x10 :10x10     Arm bike retro  DC            Ther Activity                                       Gait Training                                       Modalities

## 2023-03-13 ENCOUNTER — OFFICE VISIT (OUTPATIENT)
Dept: PHYSICAL THERAPY | Facility: CLINIC | Age: 63
End: 2023-03-13

## 2023-03-13 DIAGNOSIS — M25.512 ACUTE PAIN OF LEFT SHOULDER: Primary | ICD-10-CM

## 2023-03-13 NOTE — PROGRESS NOTES
Daily Note     Today's date: 3/13/2023  Patient name: Ginger Cavanaugh  : 1960  MRN: [id number]  Referring provider: ALLEN Llamas  Dx:   Encounter Diagnosis     ICD-10-CM    1  Acute pain of left shoulder  M25 512                      Subjective: Patient reports L shoulder symptoms continue to improve slowly  Objective: See treatment diary below      Assessment: Tolerated treatment well  Patient exhibited good technique with therapeutic exercises and would benefit from continued PT  Resumed tables slides up incline with good tolerance  Continued pain at end range flexion; less discomfort when transitioning from ER to IR PROM compared to prior sessions  Plan: Progress treatment as tolerated         Precautions:   Manuals 1/16 1/23 1/30 2/6 2/13 2/20  2/28 3/6 3/13    AP mobs              LLL             Shoulder stretching all planes  ACL    Gentle stretching  MC MC MC ACL ACL MC HA MC    LLL       ACL  HA     Neuro Re-Ed             Prone rows              Prone Ts             Scapular retractions sitting EOB             Pendulums       FWD/BWD  1 min       2x             Cane ER supine  :10x10 :10x10 :10x10 :10x10 :10x10 :10x10 :10x10 :10x10    Cane ext  10x 10x 2x10 2x10 2x10 2x10 2x10 2x10    Ther Ex             Resisted rows              Supine forward flexion AAROM with cane  10x  :10x10 :10x10 :10x10 :10x10 :10x10  :10x10 :10x10    ER table stretch  :10x10 :10x10 :10x10 :10x10 :10x10 :10x10 :10x10 /c incline :10x10 :10x10 /c incline    scaption table slides  :10x10 :10x10 :10x10 :10x10 :10x10 :10x10 :10x10 with incline :10x10 :10x10 /c incline    Forward table slides :10x10 :10x10 :10x10 :10x10 :10x10 :10x10 with incline :10x10 with incline :10x10 :10x10 /c incline    pball forward flexionl rolls  :10x10 :10x10 :10x10 :10x10 :10x10 :10x10 :10x10 :10x10 :10x10    pball scaption    :10x10 :10x10 :10x10 :10x10 :10x10 :10x10     thoracic extensions :10x10 :10x10 :10x10 :10x10 :10x10 :10x10 :10x10 :10x10 :10x10    Arm bike retro  DC            Ther Activity                                       Gait Training                                       Modalities

## 2023-03-20 ENCOUNTER — EVALUATION (OUTPATIENT)
Dept: PHYSICAL THERAPY | Facility: CLINIC | Age: 63
End: 2023-03-20

## 2023-03-20 DIAGNOSIS — M25.512 ACUTE PAIN OF LEFT SHOULDER: Primary | ICD-10-CM

## 2023-03-20 NOTE — PROGRESS NOTES
Re-Evaluation/Daily Note     Today's date: 3/20/2023  Patient name: Armando Hubbard  : 1960  MRN: [id number]  Referring provider: ALLEN Terrazas  Dx:   Encounter Diagnosis     ICD-10-CM    1  Acute pain of left shoulder  M25 512           Start Time: 1530  Stop Time: 1615  Total time in clinic (min): 45 minutes  ASSESSMENT:  Armando Hubbard is a 57 y o  female who presents symptoms consistent of chronic left shoulder pain  Patient symptoms are consistent with adhesive capsulitis contributing to left shoulder pain  Patient limited by pain and tightness with both active and passive ROM in the capsular pattern  Patient has recently received injection which has helped with overall pain levels  Upon evaluation, patient does have good improvements with AROM and strength  Patient has met all of her short term goals and is progressing appropriately toward long term goals  Patient scored a 67 on FOTO indicating improvements with overall function  Patient would benefit from continued skilled physical therapy to address the impairments, improve their level of function, and to improve their overall quality of life  Impairments:    restricted ROM    decreased strength   pain with function   activity intolerance   Postural dysfunction   scapular dyskinesis     Prognosis:  Good  Positive and negative prognostic indicator(s):  none    Goals:    Short Term Goals: to be achieved by 4 weeks  1) Patient to be independent with basic HEP  MET  2) Decrease pain to 4 and 5/10 at its worst  MET  3) Increase shoulder ROM by 5-10 degrees in all planes MET  4) Increase shoulder strength by 1/2 MMT grade in all deficient planes  MET    Long Term Goals: to be achieved by discharge  1) FOTO equal to or greater than target score indicating improvements with overall function  PROGRESSING   2) Patient to be independent with comprehensive HEP   PROGRESSING  3) Patient will demonstrate maximal over head reaching PROGRESSING  4) Patient to report no sleep interruption secondary to pain  PROGRESSING      Planned interventions:  home exercise program, patient education, manual therapy, graded activity, flexibility, functional range of motion exercises, strengthening, low level laser with IASTM and modalities prn    Duration in visits:  4  Frequency: 1 visits per week  Duration in weeks:  4    History of Current Injury: Patient notes that she can reach out in front of her, reach over head, out to the side, and across her body with decreased pain and better range of motion  Patient notes that quick movements and rotation movements are still the worst      Pain location: anterior shoulder pain that will radiate into the upper arm  (some UT pain now as well)   Pain descriptors: dull ache to sharp   Pain at Currently: 0/10 (FROM 1/10)   Pain at Best: 0/10 (FROM 1/10)  Pain at Worst: 3/10 (FROM 8/10)    Aggravating factors: reaching behind back, pulling shirt out of her sleeves, reaching for shelf, sleeping on her left side, reaching out to the side  Easing factors: Advil and tylenol combo with some relief    Imaging: x-ray unremarkable see imaging tab   Special Questions: Patient denies numbness, tingling, burning sensations  Denies significant decrease in strength  Hobbies/Interests: Loves to read and walking  Occupation: [employer] ent goals:  Patient reports goals for physical therapy would be decreased pain, independence with ADLs and increased strength       Objective     Postural Observations  Seated posture: fair  Standing posture: fair    Additional Postural Observation Details  Rounded shoulders     Palpation   Left   Hypertonic in the upper trapezius  Tenderness of the upper trapezius  Trigger point to upper trapezius  Tenderness     Left Shoulder   Tenderness in the biceps tendon (proximal) and supraspinatus tendon       Cervical/Thoracic Screen   Cervical range of motion within normal limits  Thoracic range of motion within normal limits with the following exceptions: Mild limitations in all planes     Neurological Testing     Sensation     Shoulder   Left Shoulder   Intact: light touch    Right Shoulder   Intact: light touch    Active Range of Motion   Left Shoulder   Flexion: 140 (FROM 85 degrees with pain)  Extension: 58 degrees  (FROM 30 degrees with pain)  Abduction: 145 degrees  (FROM 80 degrees with pain)  External rotation 45°: 52 degrees (FROM 20 degrees with pain)  Internal rotation 45°: 73 degrees (FROM 25 degrees with pain)    Strength/Myotome Testing     Left Shoulder     Planes of Motion   Flexion: 4  Extension: 4  Abduction: 3+   External rotation at 0°: 3+   Internal rotation at 0°: 3+     Isolated Muscles   Lower trapezius: 4-   Middle trapezius: 4-   Rhomboids: 4-     Right Shoulder   Normal muscle strength    Additional Strength Details  Strength limited by pain  Tests     Left Shoulder   Positive belly press, empty can and Speed's          Precautions:   Manuals 1/16 1/23 1/30 2/6 2/13 2/20  2/28 3/6 3/13 3/20   AP mobs              LLL             Shoulder stretching all planes  ACL    Gentle stretching  MC MC MC ACL ACL MC HA MC ACL   LLL       ACL  HA     Neuro Re-Ed             Prone rows              Prone Ts             Scapular retractions sitting EOB             Pendulums       FWD/BWD  1 min                    Cane ER supine  :10x10 :10x10 :10x10 :10x10 :10x10 :10x10 :10x10 :10x10 :10x10   Cane ext  10x 10x 2x10 2x10 2x10 2x10 2x10 2x10 2x10   Ther Ex             Resisted rows              Supine forward flexion AAROM with cane  10x  :10x10 :10x10 :10x10 :10x10 :10x10  :10x10 :10x10 :10x10   ER table stretch  :10x10 :10x10 :10x10 :10x10 :10x10 :10x10 :10x10 /c incline :10x10 :10x10 /c incline :10x10 /c incline   scaption table slides  :10x10 :10x10 :10x10 :10x10 :10x10 :10x10 :10x10 with incline :10x10 :10x10 /c incline :10x10 /c incline   Forward table slides :10x10 :10x10 :10x10 :10x10 :10x10 :10x10 with incline :10x10 with incline :10x10 :10x10 /c incline :10x10 /c incline   pball forward flexionl rolls  :10x10 :10x10 :10x10 :10x10 :10x10 :10x10 :10x10 :10x10 :10x10 :10x10   pball scaption    :10x10 :10x10 :10x10 :10x10 :10x10 :10x10 :10x10    thoracic extensions :10x10 :10x10 :10x10 :10x10 :10x10 :10x10 :10x10 :10x10 :10x10 :10x10   IR with cane behind back sliding up           :10x10   Arm bike retro  DC            Ther Activity                                       Gait Training                                       Modalities

## 2023-03-27 ENCOUNTER — OFFICE VISIT (OUTPATIENT)
Dept: PHYSICAL THERAPY | Facility: CLINIC | Age: 63
End: 2023-03-27

## 2023-03-27 DIAGNOSIS — M25.512 ACUTE PAIN OF LEFT SHOULDER: Primary | ICD-10-CM

## 2023-03-27 NOTE — PROGRESS NOTES
Daily Note     Today's date: 3/27/2023  Patient name: Amy Mendoza  : 1960  MRN: [id number]  Referring provider: ALLEN Balderrama  Dx:   Encounter Diagnosis     ICD-10-CM    1  Acute pain of left shoulder  M25 512                      Subjective: Patient reports continued improvement in L shoulder symptoms and she is able to reach her hand up her back a lot further  She feels she is ready to continue with home exercises  Objective: See treatment diary below      Assessment: Patient seen today for last formal PT session and tolerated treatment well  Patient demonstrated fatigue post treatment, exhibited good technique with therapeutic exercises and would benefit from continued PT  Increased discomfort with passive abduction stretching only with scap pin  Reviewed HEP for form and dosage  Patient was encouraged to reach out to office to schedule more appointments if symptoms worsen  Plan: DC to HEP         Precautions:   Manuals 3/27   2/6 2/13 2/20  2/28 3/6 3/13 3/20   AP mobs              LLL             Shoulder stretching all planes  MC   MC ACL ACL MC HA MC ACL   LLL       ACL  HA     Neuro Re-Ed             Prone rows              Prone Ts             Scapular retractions sitting EOB             Pendulums       FWD/BWD  1 min                    Cane ER supine :10x10   :10x10 :10x10 :10x10 :10x10 :10x10 :10x10 :10x10   Cane ext 2x10   2x10 2x10 2x10 2x10 2x10 2x10 2x10   Ther Ex             Resisted rows              Supine forward flexion AAROM with cane  :10x10   :10x10 :10x10 :10x10  :10x10 :10x10 :10x10   ER table stretch  :10x10 /c incline   :10x10 :10x10 :10x10 :10x10 /c incline :10x10 :10x10 /c incline :10x10 /c incline   scaption table slides  :10x10 /c incline   :10x10 :10x10 :10x10 :10x10 with incline :10x10 :10x10 /c incline :10x10 /c incline   Forward table slides :10x10 /c incline   :10x10 :10x10 :10x10 with incline :10x10 with incline :10x10 :10x10 /c incline :10x10 /c incline   pball forward flexionl rolls  :10x10   :10x10 :10x10 :10x10 :10x10 :10x10 :10x10 :10x10   pball scaption :10x10   :10x10 :10x10 :10x10 :10x10 :10x10 :10x10 :10x10    thoracic extensions :10x10   :10x10 :10x10 :10x10 :10x10 :10x10 :10x10 :10x10   IR with cane behind back sliding up  2x10         :10x10   Arm bike retro              Ther Activity                                       Gait Training                                       Modalities

## 2023-06-05 ENCOUNTER — TELEPHONE (OUTPATIENT)
Dept: FAMILY MEDICINE CLINIC | Facility: CLINIC | Age: 63
End: 2023-06-05

## 2023-06-21 DIAGNOSIS — F41.9 ANXIETY: ICD-10-CM

## 2023-06-21 NOTE — TELEPHONE ENCOUNTER
1 [id number] 02/15/2023 02/15/2023 ALPRAZolam (Tablet) 180 0 90 0 25 MG NA CANELO GASPAR [facility] [employer] 0 / 0 [place]    1 [id number] 08/22/2022 08/22/2022 ALPRAZolam (Tablet) 180 0 90 0 25 MG NA [address] [employer] 0 / 0

## 2023-06-22 RX ORDER — ALPRAZOLAM 0.25 MG/1
0.25 TABLET ORAL 2 TIMES DAILY PRN
Qty: 180 TABLET | Refills: 0 | Status: SHIPPED | OUTPATIENT
Start: 2023-06-22

## 2023-07-11 DIAGNOSIS — F41.9 ANXIETY: ICD-10-CM

## 2023-10-11 ENCOUNTER — TELEPHONE (OUTPATIENT)
Age: 63
End: 2023-10-11

## 2023-10-11 NOTE — TELEPHONE ENCOUNTER
Spoke to patient   She  stated she was not aware appoint was cancelled. She stated no one told her this.        I rescheduled her appoint

## 2023-10-11 NOTE — TELEPHONE ENCOUNTER
Patient requesting routine lab work to be ordered prior to her annual physical appointment that she is looking to schedule for January 2024. She had appointment scheduled through [facility] but was only 15 mins and she will find another date that allows appropriate time for an Annual Physical with the doctor. Please order routine lab work and call her when the order is ready for .

## 2023-10-23 ENCOUNTER — ANNUAL EXAM (OUTPATIENT)
Dept: OBGYN CLINIC | Facility: CLINIC | Age: 63
End: 2023-10-23
Payer: COMMERCIAL

## 2023-10-23 VITALS
WEIGHT: 130.6 LBS | DIASTOLIC BLOOD PRESSURE: 80 MMHG | SYSTOLIC BLOOD PRESSURE: 122 MMHG | HEIGHT: 64 IN | BODY MASS INDEX: 22.3 KG/M2

## 2023-10-23 DIAGNOSIS — Z78.0 ENCOUNTER FOR OSTEOPOROSIS SCREENING IN ASYMPTOMATIC POSTMENOPAUSAL PATIENT: ICD-10-CM

## 2023-10-23 DIAGNOSIS — Z01.419 ENCOUNTER FOR ANNUAL ROUTINE GYNECOLOGICAL EXAMINATION: Primary | ICD-10-CM

## 2023-10-23 DIAGNOSIS — Z13.820 ENCOUNTER FOR OSTEOPOROSIS SCREENING IN ASYMPTOMATIC POSTMENOPAUSAL PATIENT: ICD-10-CM

## 2023-10-23 DIAGNOSIS — Z12.31 ENCOUNTER FOR SCREENING MAMMOGRAM FOR MALIGNANT NEOPLASM OF BREAST: ICD-10-CM

## 2023-10-23 PROCEDURE — S0612 ANNUAL GYNECOLOGICAL EXAMINA: HCPCS | Performed by: OBSTETRICS & GYNECOLOGY

## 2023-10-23 NOTE — PROGRESS NOTES
Assessment/Plan:  Normal gynecologic exam  Co-testing '- negative  Return the office in 1 year  Self breast exams monthly  Screening for osteoporosis - BMD  Annual 3-D mammography  Lifetime Risk of Breast Cancer - erroneous ', 7.64 - query sent to Dr. Bakari Morgan, radiology  Colonoscopy- 10/20 2 polyps- repeat 5 yrs. Calcium 1000 milligrams with vitamin D- does  Exercise 3 times a week- does. Diagnoses and all orders for this visit:    Encounter for annual routine gynecological examination    Encounter for screening mammogram for malignant neoplasm of breast  -     Mammo screening bilateral w 3d & cad; Future    Encounter for osteoporosis screening in asymptomatic postmenopausal patient  -     DXA bone density spine hip and pelvis; Future              Subjective:        Patient ID: Nayana Mcintyre is a 70 y.o. female. Charley Reina returns for an annual visit. She has no complaints. She denies any vaginal bleeding. She is not sexually active. Her health has remained stable in the past year. The following portions of the patient's history were reviewed and updated as appropriate: She  has a past medical history of Fractures.   Patient Active Problem List    Diagnosis Date Noted    Encounter for screening mammogram for malignant neoplasm of breast 10/04/2022    Increased risk of breast cancer 10/04/2022    Adhesive capsulitis of right shoulder 10/01/2018    Encounter for annual routine gynecological examination 2018    Encounter for screening mammogram for breast cancer 2018    Chronic right shoulder pain 09/10/2018    Subacromial bursitis of right shoulder joint 2018    Tendonitis of shoulder, right 2018    Biceps tendinosis of right shoulder 2018    Anxiety 2012   PMH:    1 para 1;  '  Dr. Elizabeth Hoffman  Menopause 44  Anxiety 23  Dysmenorrhea  Primary Infertility- diagnostic laparoscopy with endometrial biopsy   Left  excisional  breast biopsy '  Menopausal symptoms '11  Bilateral cataracts removed 8/16, 9/16      Right shoulder adhesive capsulitis 10/18 PT resolved 12/18       Covid 7/22  She  has a past surgical history that includes Endometrial biopsy (1982); Breast surgery (Left); Cataract extraction; Diagnostic laparoscopy (6498); [place] tooth extraction; Tonsillectomy; FL injection right shoulder (arthrogram) (9/28/2018); and Breast cyst excision (Left, 1979). Her family history includes Breast cancer (age of onset: 48) in her paternal aunt and paternal aunt; Diabetes in her maternal grandfather and mother; Heart attack in her paternal grandfather; Hypertension in her father and mother; No Known Problems in her brother, daughter, and sister; Osteoporosis in her mother; Stroke in her mother. FH:  M 78- poorly controlled diabetes leading to several wound problems and kidney disease. Osteoporosis,HTN, CVA d 7/18 combination of things      F- 79 DM, Fall 7/16-fractured femur (surgery); screws broke, Hip Replacement 10/16; 2 episodes of Hip Disclocations, still active at 78 ['21]      B- nervous breakdown 54 7/18  She  reports that she has never smoked. She has never used smokeless tobacco. She reports current alcohol use. She reports that she does not use drugs. SH:  [address]- . '20 had only 1 COVID case and that patient was in hospice. . Kylie 8/88- . She took Pat for a cruise to celebrate her 58th. They are going on another cruise together this year. Current Outpatient Medications   Medication Sig Dispense Refill    ALPRAZolam (XANAX) 0.25 mg tablet Take 1 tablet (0.25 mg total) by mouth 2 (two) times a day as needed for anxiety 180 tablet 0    FLUoxetine (PROzac) 20 mg capsule Take 1 capsule (20 mg total) by mouth daily 90 capsule 3    Multiple Vitamins-Minerals (MULTIVITAMIN ADULTS) TABS Take 1 tablet by mouth daily       No current facility-administered medications for this visit.      Current Outpatient Medications on File Prior to Visit   Medication Sig    ALPRAZolam (XANAX) 0.25 mg tablet Take 1 tablet (0.25 mg total) by mouth 2 (two) times a day as needed for anxiety    FLUoxetine (PROzac) 20 mg capsule Take 1 capsule (20 mg total) by mouth daily    Multiple Vitamins-Minerals (MULTIVITAMIN ADULTS) TABS Take 1 tablet by mouth daily     No current facility-administered medications on file prior to visit. She is allergic to azithromycin and penicillins. .    Review of Systems   Constitutional:  Negative for activity change, appetite change, fatigue and unexpected weight change. Eyes:  Negative for visual disturbance. Respiratory:  Negative for cough, chest tightness, shortness of breath and wheezing. Cardiovascular:  Negative for chest pain, palpitations and leg swelling. Breast: Patient denies tenderness, nipple discharge, masses, or erythema. Gastrointestinal:  Negative for abdominal distention, abdominal pain, blood in stool, constipation, diarrhea, nausea and vomiting. Endocrine: Negative for cold intolerance and heat intolerance. Genitourinary:  Negative for decreased urine volume, difficulty urinating, dysuria, frequency, hematuria, menstrual problem, pelvic pain, urgency, vaginal bleeding, vaginal discharge and vaginal pain. No incontinence. Not sexually active. Musculoskeletal:  Negative for arthralgias. Skin:  Negative for rash. Neurological:  Negative for weakness, light-headedness, numbness and headaches. Hematological:  Does not bruise/bleed easily. Psychiatric/Behavioral:  Negative for agitation, behavioral problems and sleep disturbance. The patient is not nervous/anxious. Objective:    Vitals:    10/23/23 1527   BP: 122/80   BP Location: Right arm   Patient Position: Sitting   Cuff Size: Standard   Weight: 59.2 kg (130 lb 9.6 oz)   Height: 5' 4" (1.626 m)            Physical Exam  Vitals and nursing note reviewed.  Exam conducted with a chaperone present. Constitutional:       General: She is not in acute distress. Appearance: Normal appearance. She is well-developed. HENT:      Head: Normocephalic and atraumatic. Eyes:      General: No scleral icterus. Right eye: No discharge. Left eye: No discharge. Extraocular Movements: Extraocular movements intact. Conjunctiva/sclera: Conjunctivae normal.   Neck:      Thyroid: No thyromegaly. Trachea: No tracheal deviation. Cardiovascular:      Rate and Rhythm: Normal rate and regular rhythm. Heart sounds: Normal heart sounds. No murmur heard. Pulmonary:      Effort: Pulmonary effort is normal. No respiratory distress. Breath sounds: Normal breath sounds. No wheezing. Chest:   Breasts:     Breasts are symmetrical.      Right: No inverted nipple, mass, nipple discharge, skin change or tenderness. Left: No inverted nipple, mass, nipple discharge, skin change or tenderness. Abdominal:      General: Bowel sounds are normal. There is no distension. Palpations: Abdomen is soft. There is no mass. Tenderness: There is no abdominal tenderness. There is no guarding or rebound. Genitourinary:     General: Normal vulva. Labia:         Right: No rash, tenderness or lesion. Left: No rash, tenderness or lesion. Vagina: Normal.      Cervix: No cervical motion tenderness or discharge. Uterus: Not deviated, not enlarged and not tender. Adnexa:         Right: No mass, tenderness or fullness. Left: No mass, tenderness or fullness. Rectum: No external hemorrhoid. Comments: Urethral meatus within normal limits. Perineum within normal limits. Bladder well supported. Physiologic vaginal atrophy. Musculoskeletal:         General: No tenderness. Normal range of motion. Cervical back: Normal range of motion and neck supple. Lymphadenopathy:      Cervical: No cervical adenopathy.    Skin:     General: Skin is warm and dry. Neurological:      Mental Status: She is alert and oriented to person, place, and time. Psychiatric:         Mood and Affect: Mood normal.         Behavior: Behavior normal.         Thought Content:  Thought content normal.         Judgment: Judgment normal.

## 2023-11-13 DIAGNOSIS — F41.9 ANXIETY: ICD-10-CM

## 2023-11-13 RX ORDER — FLUOXETINE HYDROCHLORIDE 20 MG/1
20 CAPSULE ORAL DAILY
Qty: 90 CAPSULE | Refills: 0 | Status: SHIPPED | OUTPATIENT
Start: 2023-11-13

## 2023-11-20 ENCOUNTER — TELEPHONE (OUTPATIENT)
Dept: FAMILY MEDICINE CLINIC | Facility: CLINIC | Age: 63
End: 2023-11-20

## 2023-11-20 DIAGNOSIS — Z00.00 ANNUAL PHYSICAL EXAM: Primary | ICD-10-CM

## 2023-11-20 NOTE — TELEPHONE ENCOUNTER
Patient sent [employer] message requesting the following:    Regarding my appointment on January 11, 2024 for my annual Wellness Check I do not see that the script for the routine blood work has been ordered by the doctor. According to my insurance company I need to provide them with Total Cholesterol, HDL Cholesterol, LDL cholesterol, Triglycerides and Total Blood Glucose level. Could you please enter lab orders for patient to have completed prior to her appointment in January.

## 2023-11-30 DIAGNOSIS — F41.9 ANXIETY: ICD-10-CM

## 2023-11-30 RX ORDER — ALPRAZOLAM 0.25 MG/1
0.25 TABLET ORAL 2 TIMES DAILY PRN
Qty: 180 TABLET | Refills: 0 | Status: SHIPPED | OUTPATIENT
Start: 2023-11-30

## 2023-11-30 NOTE — TELEPHONE ENCOUNTER
1 [id number] 06/22/2023 06/22/2023 ALPRAZolam (Tablet) 180.0 90 0.25 MG NA CANELO BROADT [facility] [employer] 0 / 0 [place]    1 [id number] 02/15/2023 02/15/2023 ALPRAZolam (Tablet) 180.0 90 0.25 MG NA CANELO BROADT [facility] [employer] 0 / 0

## 2023-12-02 ENCOUNTER — APPOINTMENT (OUTPATIENT)
Dept: LAB | Facility: MEDICAL CENTER | Age: 63
End: 2023-12-02
Payer: COMMERCIAL

## 2023-12-02 DIAGNOSIS — Z00.00 ANNUAL PHYSICAL EXAM: ICD-10-CM

## 2023-12-02 LAB
CHOLEST SERPL-MCNC: 192 MG/DL
GLUCOSE P FAST SERPL-MCNC: 78 MG/DL (ref 65–99)
HDLC SERPL-MCNC: 85 MG/DL
LDLC SERPL CALC-MCNC: 97 MG/DL (ref 0–100)
NONHDLC SERPL-MCNC: 107 MG/DL
TRIGL SERPL-MCNC: 50 MG/DL

## 2023-12-02 PROCEDURE — 80061 LIPID PANEL: CPT

## 2023-12-02 PROCEDURE — 36415 COLL VENOUS BLD VENIPUNCTURE: CPT

## 2023-12-02 PROCEDURE — 82947 ASSAY GLUCOSE BLOOD QUANT: CPT

## 2024-01-11 ENCOUNTER — OFFICE VISIT (OUTPATIENT)
Dept: FAMILY MEDICINE CLINIC | Facility: CLINIC | Age: 64
End: 2024-01-11
Payer: COMMERCIAL

## 2024-01-11 VITALS
BODY MASS INDEX: 22.43 KG/M2 | HEIGHT: 64 IN | TEMPERATURE: 97.4 F | DIASTOLIC BLOOD PRESSURE: 70 MMHG | OXYGEN SATURATION: 95 % | HEART RATE: 51 BPM | WEIGHT: 131.4 LBS | SYSTOLIC BLOOD PRESSURE: 116 MMHG

## 2024-01-11 DIAGNOSIS — Z00.00 ANNUAL PHYSICAL EXAM: Primary | ICD-10-CM

## 2024-01-11 DIAGNOSIS — Z23 ENCOUNTER FOR IMMUNIZATION: ICD-10-CM

## 2024-01-11 PROCEDURE — 99397 PER PM REEVAL EST PAT 65+ YR: CPT | Performed by: FAMILY MEDICINE

## 2024-01-11 PROCEDURE — 90677 PCV20 VACCINE IM: CPT | Performed by: FAMILY MEDICINE

## 2024-01-11 PROCEDURE — 90471 IMMUNIZATION ADMIN: CPT | Performed by: FAMILY MEDICINE

## 2024-01-11 NOTE — PROGRESS NOTES
ADULT ANNUAL PHYSICAL  [facility] MEDICINE JOE    NAME: Rashida Pulliam  AGE: 63 y.o. SEX: female  : 1960     DATE: 2024     Assessment and Plan:     Problem List Items Addressed This Visit    None      Immunizations and preventive care screenings were discussed with patient today. Appropriate education was printed on patient's after visit summary.    Counseling:  Exercise: the importance of regular exercise/physical activity was discussed. Recommend exercise 3-5 times per week for at least 30 minutes.          No follow-ups on file.     Chief Complaint:     Chief Complaint   Patient presents with    Physical Exam      History of Present Illness:     Adult Annual Physical   Patient here for a comprehensive physical exam. The patient reports no problems.    Diet and Physical Activity  Diet/Nutrition: well balanced diet.   Exercise: walking.      Depression Screening  PHQ-2/9 Depression Screening           General Health  Sleep: sleeps well.   Hearing: normal - bilateral.  Vision: no vision problems.   Dental: regular dental visits.       /GYN Health  Follows with gynecology? no   Patient is: postmenopausal  Last menstrual period:   Contraceptive method: .    Advanced Care Planning  Do you have an advanced directive? no  Do you have a durable medical power of ? no     Review of Systems:     Review of Systems   Constitutional:  Negative for appetite change, chills and fever.   HENT:  Negative for ear pain, facial swelling, rhinorrhea, sinus pain, sore throat and trouble swallowing.    Eyes:  Negative for discharge and redness.   Respiratory:  Negative for chest tightness, shortness of breath and wheezing.    Cardiovascular:  Negative for chest pain and palpitations.   Gastrointestinal:  Negative for abdominal pain, diarrhea, nausea and vomiting.   Endocrine: Negative for polyuria.   Genitourinary:  Negative for dysuria and urgency.    Musculoskeletal:  Negative for arthralgias and back pain.   Skin:  Negative for rash.   Neurological:  Negative for dizziness, weakness and headaches.   Hematological:  Negative for adenopathy.   Psychiatric/Behavioral:  Negative for behavioral problems, confusion and sleep disturbance.    All other systems reviewed and are negative.     Past Medical History:     Past Medical History:   Diagnosis Date    Fractures       Past Surgical History:     Past Surgical History:   Procedure Laterality Date    BREAST CYST EXCISION Left 1979    fibroid tumor removed, 1979    BREAST SURGERY Left     Removal of fibroid tumor. Resolved: 1980    CATARACT EXTRACTION      Last assessed: 10/11/16    DIAGNOSTIC LAPAROSCOPY  1982    ENDOMETRIAL BIOPSY  1982    w/o cervical dilation    FL INJECTION RIGHT SHOULDER (ARTHROGRAM)  9/28/2018    TONSILLECTOMY      WISDOM TOOTH EXTRACTION      Resolved: 1977      Social History:     Social History     Socioeconomic History    Marital status: /Civil Union     Spouse name: Not on file    Number of children: 1    Years of education: Not on file    Highest education level: Not on file   Occupational History    Occupation: Full-time employment   Tobacco Use    Smoking status: Never    Smokeless tobacco: Never   Vaping Use    Vaping status: Never Used   Substance and Sexual Activity    Alcohol use: Yes     Comment: Acute    Drug use: No    Sexual activity: Not Currently     Partners: Male   Other Topics Concern    Not on file   Social History Narrative    Daily caffeine consumption: 2-3 servings a day    Exercises 1 to 2 times per week     Social Determinants of Health     Financial Resource Strain: Not on file   Food Insecurity: Not on file   Transportation Needs: Not on file   Physical Activity: Not on file   Stress: Not on file   Social Connections: Not on file   Intimate Partner Violence: Not on file   Housing Stability: Not on file      Family History:     Family History   Problem  "Relation Age of Onset    Diabetes Mother         Mellitus    Stroke Mother         CVA    Osteoporosis Mother     Hypertension Mother     Hypertension Father     Diabetes Maternal Grandfather         Mellitus    Heart attack Paternal Grandfather         Myocardial Infarction    Breast cancer Paternal Aunt 50    No Known Problems Sister     No Known Problems Daughter     Breast cancer Paternal Aunt 50    No Known Problems Brother       Current Medications:     Current Outpatient Medications   Medication Sig Dispense Refill    ALPRAZolam (XANAX) 0.25 mg tablet Take 1 tablet (0.25 mg total) by mouth 2 (two) times a day as needed for anxiety 180 tablet 0    FLUoxetine (PROzac) 20 mg capsule Take 1 capsule (20 mg total) by mouth daily 90 capsule 0    Multiple Vitamins-Minerals (MULTIVITAMIN ADULTS) TABS Take 1 tablet by mouth daily       No current facility-administered medications for this visit.      Allergies:     Allergies   Allergen Reactions    Azithromycin Hives     Reaction Date: 03Aug2011;     Penicillins Rash      Physical Exam:     Ht 5' 4\" (1.626 m)   LMP  (LMP Unknown)   BMI 22.42 kg/m²     Physical Exam  Vitals and nursing note reviewed.   Constitutional:       General: She is not in acute distress.     Appearance: Normal appearance. She is not ill-appearing or diaphoretic.   HENT:      Head: Normocephalic and atraumatic.      Right Ear: Tympanic membrane, ear canal and external ear normal.      Left Ear: Tympanic membrane, ear canal and external ear normal.      Nose: Nose normal. No congestion or rhinorrhea.      Mouth/Throat:      Mouth: Mucous membranes are moist.      Pharynx: Oropharynx is clear. No posterior oropharyngeal erythema.   Eyes:      General:         Right eye: No discharge.         Left eye: No discharge.      Extraocular Movements: Extraocular movements intact.      Conjunctiva/sclera: Conjunctivae normal.      Pupils: Pupils are equal, round, and reactive to light.   Neck:      " Vascular: No carotid bruit.   Cardiovascular:      Rate and Rhythm: Normal rate and regular rhythm.      Pulses: Normal pulses.      Heart sounds: Normal heart sounds. No murmur heard.  Pulmonary:      Effort: Pulmonary effort is normal. No respiratory distress.      Breath sounds: Normal breath sounds. No wheezing or rhonchi.   Abdominal:      General: Abdomen is flat. Bowel sounds are normal. There is no distension.      Palpations: There is no mass.      Tenderness: There is no abdominal tenderness.   Musculoskeletal:         General: No swelling or deformity. Normal range of motion.      Cervical back: Normal range of motion and neck supple. No rigidity.      Right lower leg: No edema.      Left lower leg: No edema.   Lymphadenopathy:      Cervical: No cervical adenopathy.   Skin:     General: Skin is warm and dry.      Capillary Refill: Capillary refill takes less than 2 seconds.      Coloration: Skin is not jaundiced.      Findings: No bruising, erythema or rash.   Neurological:      General: No focal deficit present.      Mental Status: She is alert and oriented to person, place, and time.      Cranial Nerves: No cranial nerve deficit.      Sensory: No sensory deficit.      Gait: Gait normal.      Deep Tendon Reflexes: Reflexes normal.   Psychiatric:         Mood and Affect: Mood normal.         Behavior: Behavior normal.         Thought Content: Thought content normal.         Judgment: Judgment normal.          ABDIFATAH KELLEY  [facility]

## 2024-01-15 ENCOUNTER — HOSPITAL ENCOUNTER (OUTPATIENT)
Dept: RADIOLOGY | Age: 64
Discharge: HOME | End: 2024-01-15
Payer: COMMERCIAL

## 2024-01-15 VITALS — HEIGHT: 64 IN | WEIGHT: 131 LBS | BODY MASS INDEX: 22.36 KG/M2

## 2024-01-15 DIAGNOSIS — Z12.31 ENCOUNTER FOR SCREENING MAMMOGRAM FOR MALIGNANT NEOPLASM OF BREAST: ICD-10-CM

## 2024-01-15 PROCEDURE — 77067 SCR MAMMO BI INCL CAD: CPT

## 2024-01-15 PROCEDURE — 77063 BREAST TOMOSYNTHESIS BI: CPT

## 2024-02-04 DIAGNOSIS — F41.9 ANXIETY: ICD-10-CM

## 2024-02-04 RX ORDER — FLUOXETINE HYDROCHLORIDE 20 MG/1
20 CAPSULE ORAL DAILY
Qty: 90 CAPSULE | Refills: 0 | Status: SHIPPED | OUTPATIENT
Start: 2024-02-04

## 2024-02-21 ENCOUNTER — HOSPITAL ENCOUNTER (OUTPATIENT)
Dept: RADIOLOGY | Facility: MEDICAL CENTER | Age: 64
Discharge: HOME | End: 2024-02-21
Payer: COMMERCIAL

## 2024-02-21 DIAGNOSIS — Z78.0 ENCOUNTER FOR OSTEOPOROSIS SCREENING IN ASYMPTOMATIC POSTMENOPAUSAL PATIENT: ICD-10-CM

## 2024-02-21 DIAGNOSIS — Z13.820 ENCOUNTER FOR OSTEOPOROSIS SCREENING IN ASYMPTOMATIC POSTMENOPAUSAL PATIENT: ICD-10-CM

## 2024-02-21 PROBLEM — Z01.419 ENCOUNTER FOR ANNUAL ROUTINE GYNECOLOGICAL EXAMINATION: Status: RESOLVED | Noted: 2018-09-20 | Resolved: 2024-02-21

## 2024-02-21 PROCEDURE — 77080 DXA BONE DENSITY AXIAL: CPT

## 2024-05-07 DIAGNOSIS — F41.9 ANXIETY: ICD-10-CM

## 2024-05-07 RX ORDER — FLUOXETINE HYDROCHLORIDE 20 MG/1
20 CAPSULE ORAL DAILY
Qty: 90 CAPSULE | Refills: 0 | Status: SHIPPED | OUTPATIENT
Start: 2024-05-07

## 2024-05-14 DIAGNOSIS — F41.9 ANXIETY: ICD-10-CM

## 2024-05-15 RX ORDER — ALPRAZOLAM 0.25 MG/1
0.25 TABLET ORAL 2 TIMES DAILY PRN
Qty: 180 TABLET | Refills: 0 | Status: SHIPPED | OUTPATIENT
Start: 2024-05-15

## 2024-05-15 NOTE — TELEPHONE ENCOUNTER
1 [id number] 11/30/2023 11/30/2023 ALPRAZolam (Tablet) 180.0 90 0.25 MG NA CANELO BROADT [facility] [employer] 0 / 0 [place]    1 [id number] 06/22/2023 06/22/2023 ALPRAZolam (Tablet) 180.0 90 0.25 MG NA CANELO BROADT [facility] Commercial [employer] 0 / 0 [place]

## 2024-05-30 ENCOUNTER — OFFICE VISIT (OUTPATIENT)
Dept: FAMILY MEDICINE CLINIC | Facility: CLINIC | Age: 64
End: 2024-05-30
Payer: COMMERCIAL

## 2024-05-30 VITALS
OXYGEN SATURATION: 99 % | BODY MASS INDEX: 22.2 KG/M2 | DIASTOLIC BLOOD PRESSURE: 78 MMHG | HEART RATE: 58 BPM | SYSTOLIC BLOOD PRESSURE: 120 MMHG | HEIGHT: 64 IN | TEMPERATURE: 97.3 F | WEIGHT: 130 LBS

## 2024-05-30 DIAGNOSIS — J01.90 ACUTE SINUSITIS, RECURRENCE NOT SPECIFIED, UNSPECIFIED LOCATION: Primary | ICD-10-CM

## 2024-05-30 PROCEDURE — 99213 OFFICE O/P EST LOW 20 MIN: CPT | Performed by: FAMILY MEDICINE

## 2024-05-30 RX ORDER — CEFUROXIME AXETIL 500 MG/1
500 TABLET ORAL EVERY 12 HOURS SCHEDULED
Qty: 20 TABLET | Refills: 0 | Status: SHIPPED | OUTPATIENT
Start: 2024-05-30 | End: 2024-06-09

## 2024-05-30 RX ORDER — PREDNISONE 10 MG/1
TABLET ORAL
Qty: 26 TABLET | Refills: 0 | Status: SHIPPED | OUTPATIENT
Start: 2024-05-30

## 2024-05-30 NOTE — PROGRESS NOTES
Patient ID: Rashida uPlliam is a 63 y.o. female.    HPI: 63 y.o.female presenting with symptoms of sinus pain,pressure, nasal congestion, pnd dry cough , ear and throat pain.      SUBJECTIVE    Family History   Problem Relation Age of Onset    Diabetes Mother         Mellitus    Stroke Mother         CVA    Osteoporosis Mother     Hypertension Mother     Hypertension Father     Diabetes Maternal Grandfather         Mellitus    Heart attack Paternal Grandfather         Myocardial Infarction    Breast cancer Paternal Aunt 50    No Known Problems Sister     No Known Problems Daughter     Breast cancer Paternal Aunt 50    No Known Problems Brother      Social History     Socioeconomic History    Marital status: /Civil Union     Spouse name: Not on file    Number of children: 1    Years of education: Not on file    Highest education level: Not on file   Occupational History    Occupation: Full-time employment   Tobacco Use    Smoking status: Never     Passive exposure: Never    Smokeless tobacco: Never   Vaping Use    Vaping status: Never Used   Substance and Sexual Activity    Alcohol use: Yes     Comment: Acute    Drug use: No    Sexual activity: Not Currently     Partners: Male   Other Topics Concern    Not on file   Social History Narrative    Daily caffeine consumption: 2-3 servings a day    Exercises 1 to 2 times per week     Social Determinants of Health     Financial Resource Strain: Not on file   Food Insecurity: Not on file   Transportation Needs: Not on file   Physical Activity: Not on file   Stress: Not on file   Social Connections: Not on file   Intimate Partner Violence: Not on file   Housing Stability: Not on file     Past Medical History:   Diagnosis Date    Fractures      Past Surgical History:   Procedure Laterality Date    BREAST CYST EXCISION Left 1979    fibroid tumor removed, 1979    BREAST SURGERY Left     Removal of fibroid tumor. Resolved: 1980    CATARACT EXTRACTION      Last assessed:  10/11/16    DIAGNOSTIC LAPAROSCOPY  1982    ENDOMETRIAL BIOPSY  1982    w/o cervical dilation    FL INJECTION RIGHT SHOULDER (ARTHROGRAM)  9/28/2018    TONSILLECTOMY      WISDOM TOOTH EXTRACTION      Resolved: 1977     Allergies   Allergen Reactions    Azithromycin Hives     Reaction Date: 03Aug2011;     Penicillins Rash       Current Outpatient Medications:     ALPRAZolam (XANAX) 0.25 mg tablet, Take 1 tablet (0.25 mg total) by mouth 2 (two) times a day as needed for anxiety, Disp: 180 tablet, Rfl: 0    CALCIUM-VITAMIN D PO, Take by mouth, Disp: , Rfl:     FLUoxetine (PROzac) 20 mg capsule, take 1 capsule by mouth once daily, Disp: 90 capsule, Rfl: 0    Multiple Vitamins-Minerals (MULTIVITAMIN ADULTS) TABS, Take 1 tablet by mouth daily, Disp: , Rfl:     Review of Systems  Constitutional:     Denies fever, chills, fatigue, weakness ,weight loss, weight gain      ENT: Denies earache, loss of hearing, nosebleed, nasal discharge,but complains of nasal congestion, sore throat,hoarseness and sinus pain and pressure    Pulmonary: Denies shortness of breath ,cough , dyspnea on exertionon, orthopnea ,+ PND   Cardiovascular:  Denies bradycardia , tachycardia ,palpations, lower extremity, edema leg, claudication  Breast:  Denies new or changing breast lumps,  nipple discharge, nipple changes,  Abdomen:  Denies abdominal pain , anorexia ,indigestion, nausea ,vomiting, constipation , diarrhea  Musculoskeletal: Denies myalgias, arthralgias, joint swelling, joint stiffness ,limb pain, limb swelling  Lymph:+ swollen glands  Gu: no dysuria or urinary frequency  Skin: Denies skin rash, skin lesion, skin wound, itching,dry skin  Neuro: Denies headache, numbness, tingling, confusion, loss of consciousness, dizziness ,vertigo  Psychiatric: Denies feelings of depression, suicidal ideation, anxiety, sleep disturbances    OBJECTIVE  /78 (BP Location: Left arm, Patient Position: Sitting, Cuff Size: Adult)   Pulse 58   Temp (!) 97.3  "°F (36.3 °C)   Ht 5' 4\" (1.626 m)   Wt 59 kg (130 lb)   LMP  (LMP Unknown)   SpO2 99%   BMI 22.31 kg/m²   Constitutional:   NAD, well appearing and well nourished      ENT:   Conjunctiva and lids: no injection, edema, or discharge    Pupils and iris: HUMBERTO bilaterally   External inspection of ears and nose: normal without deformities or discharge.      Otoscopic exam: Canals patent ; tm are dull, with with erythem and effusions  ENasal mucosa, septum and turbinates: Turbinae injection with discharge   Oropharynx:  Moist mucosa, normal tongue and tonsils without lesions.Erythema and injection  of post pharynx with pnd      Pulmonary:Respiratory effort normal rate and rhythm, no increased work of breathing. Auscultation of lungs:  Clear bilaterally with no adventitious breath sounds       Cardiovascular: regular rate and rhythm, S1 and S2, no murmur, no edema and/or varicosities of LE      Abdomen: Soft and non-distended    Positive bowel sounds    No heptomegaly or splenomegaly    Lymphatic: Anterior  cervical lymphadenopathy         Muscskeletal:  Gait and station: Normal gait     Digits and nails normal without clubbing or cyanosis     Inspection/palpation of joints, bones, and muscles:  No joint tenderness, swelling, full active and passive range of motion      Gu: no suprabubic tenderness, CVA tenderness or urethral discharge  Skin: Normal skin turgor and no rashes    Neuro:    Normal reflexes   Psych:   alert and oriented to person, place and time  normal mood and affect      Assessment/Plan:Diagnoses and all orders for this visit:    Acute sinusitis, recurrence not specified, unspecified location  -     predniSONE 10 mg tablet; 3 tabs po bid x2 days, then 2 tabs po bid x2 days, then 1 tab bid x2 days, then 1 daily until done.  -     cefuroxime (CEFTIN) 500 mg tablet; Take 1 tablet (500 mg total) by mouth every 12 (twelve) hours for 10 days    Other orders  -     CALCIUM-VITAMIN D PO; Take by " mouth        Reviewed with patient plan to treat with above plan.    Patient instructed to call in 72 hours if not feeling better or if symptoms worsen

## 2024-08-06 DIAGNOSIS — F41.9 ANXIETY: ICD-10-CM

## 2024-08-06 RX ORDER — FLUOXETINE HYDROCHLORIDE 20 MG/1
20 CAPSULE ORAL DAILY
Qty: 90 CAPSULE | Refills: 1 | Status: SHIPPED | OUTPATIENT
Start: 2024-08-06

## 2024-10-29 ENCOUNTER — ANNUAL EXAM (OUTPATIENT)
Dept: OBGYN CLINIC | Facility: MEDICAL CENTER | Age: 64
End: 2024-10-29
Payer: COMMERCIAL

## 2024-10-29 VITALS
SYSTOLIC BLOOD PRESSURE: 130 MMHG | DIASTOLIC BLOOD PRESSURE: 82 MMHG | WEIGHT: 132 LBS | HEIGHT: 65 IN | BODY MASS INDEX: 21.99 KG/M2

## 2024-10-29 DIAGNOSIS — Z12.31 ENCOUNTER FOR SCREENING MAMMOGRAM FOR BREAST CANCER: ICD-10-CM

## 2024-10-29 DIAGNOSIS — Z01.419 ENCNTR FOR GYN EXAM (GENERAL) (ROUTINE) W/O ABN FINDINGS: Primary | ICD-10-CM

## 2024-10-29 PROCEDURE — S0612 ANNUAL GYNECOLOGICAL EXAMINA: HCPCS | Performed by: NURSE PRACTITIONER

## 2024-10-29 NOTE — PATIENT INSTRUCTIONS
Calcium 7362-1600 mg (in divided doses-max 600 mg at one time) + 800-1000 IU Vit D daily unless otherwise directed. Avoid falls.   Exercise 150-300 minutes per week minimum including weight bearing exercises. DEXA scan- due 2026     Call your insurance company to verify coverage prior to completing any ordered tests.    No further paps after age 65 as long as there has been adequate normal paps completed, no history of DORYS 2  or a more severe pap diagnosis in the last 20 years.     Annual mammogram already ordered and monthly breast self exam recommended .     Colonoscopy-  Due 2025         Kegels 20 times twice daily. Vaginal moisturizers twice weekly as needed.   Return to office in one year or sooner, if needed.

## 2024-10-29 NOTE — PROGRESS NOTES
Assessment/Plan:  Calcium 8777-7670 mg (in divided doses-max 600 mg at one time) + 800-1000 IU Vit D daily unless otherwise directed. Avoid falls.   Exercise 150-300 minutes per week minimum including weight bearing exercises. DEXA scan- due      Call your insurance company to verify coverage prior to completing any ordered tests.    No further paps after age 65 as long as there has been adequate normal paps completed, no history of DORYS 2  or a more severe pap diagnosis in the last 20 years.     Annual mammogram already ordered and monthly breast self exam recommended .     Colonoscopy-  Due          Kegels 20 times twice daily. Vaginal moisturizers twice weekly as needed.   Return to office in one year or sooner, if needed.        1. Encntr for gyn exam (general) (routine) w/o abn findings  2. Encounter for screening mammogram for breast cancer             Subjective:      Patient ID: Rashida Pulliam is a 64 y.o. female.    HPI    Rashida Pulliam is a 64 y.o.  ( 35 yo girl ) female who is here today for her annual visit. No gynecologic health concerns.   Menopausal  with no vaginal bleeding   Exercise- walks most days of the week.   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     Rashida Pulliam is not sexually active with male partner/  of 42 years. This is acceptable to both of them. Feels safe in this relationship. Denies vaginal pain,bleeding or dryness.    She is not interested in STD screening today.   She denies vaginal discharge, itching or pelvic pain.   She has no urinary concerns, does not have incontinence.  No bowel concerns.  No breast concerns.     Last pap: Always normal paps  14 normal with negative HR HPV   2021 normal with negative HR HPV   Mammogram: 01/15/2024 normal   Colonoscopy: 10/05/2020 recall 5 years  DEXA scan: 2024 osteopenia      Family history of cancer:  "  Cancer-related family history includes Breast cancer (age of onset: 50) in her paternal aunt and paternal aunt.      The following portions of the patient's history were reviewed and updated as appropriate: allergies, current medications, past family history, past medical history, past social history, past surgical history, and problem list.    Review of Systems   Constitutional: Negative.  Negative for activity change, appetite change, chills, diaphoresis, fatigue, fever and unexpected weight change.   HENT:  Negative for congestion, dental problem, sneezing, sore throat and trouble swallowing.    Eyes:  Negative for visual disturbance.   Respiratory:  Negative for chest tightness and shortness of breath.    Cardiovascular:  Negative for chest pain and leg swelling.   Gastrointestinal:  Negative for abdominal pain, constipation, diarrhea, nausea and vomiting.   Genitourinary:  Negative for difficulty urinating, dysuria, frequency, hematuria, pelvic pain, urgency, vaginal bleeding, vaginal discharge and vaginal pain.   Musculoskeletal:  Negative for back pain and neck pain.   Skin: Negative.    Allergic/Immunologic: Negative.    Neurological:  Negative for weakness and headaches.   Hematological:  Negative for adenopathy.   Psychiatric/Behavioral: Negative.           Objective:      /82 (BP Location: Left arm, Patient Position: Sitting, Cuff Size: Standard)   Ht 5' 4.5\" (1.638 m)   Wt 59.9 kg (132 lb)   LMP  (LMP Unknown)   BMI 22.31 kg/m²          Physical Exam  Vitals and nursing note reviewed.   Constitutional:       Appearance: Normal appearance. She is well-developed.   HENT:      Head: Normocephalic.   Neck:      Thyroid: No thyromegaly.   Cardiovascular:      Rate and Rhythm: Normal rate and regular rhythm.      Heart sounds: Normal heart sounds.   Pulmonary:      Effort: Pulmonary effort is normal.      Breath sounds: Normal breath sounds.   Chest:   Breasts:     Breasts are symmetrical.      " Right: Normal. No inverted nipple, mass, nipple discharge, skin change or tenderness.      Left: Normal. No inverted nipple, mass, nipple discharge, skin change or tenderness.   Abdominal:      Palpations: Abdomen is soft.   Genitourinary:     General: Normal vulva.      Exam position: Lithotomy position.      Labia:         Right: No rash, tenderness, lesion or injury.         Left: No rash, tenderness, lesion or injury.       Urethra: No prolapse, urethral pain, urethral swelling or urethral lesion.      Vagina: No signs of injury and foreign body. No vaginal discharge, erythema, tenderness, bleeding, lesions or prolapsed vaginal walls.      Cervix: Normal.      Uterus: Normal.       Adnexa: Right adnexa normal and left adnexa normal.        Right: No mass, tenderness or fullness.          Left: No mass, tenderness or fullness.        Rectum: No external hemorrhoid.      Comments: Slight vulvovaginal atrophy  Musculoskeletal:         General: Normal range of motion.      Cervical back: Normal range of motion.   Lymphadenopathy:      Head:      Right side of head: No submental, submandibular, tonsillar or occipital adenopathy.      Left side of head: No submental, submandibular, tonsillar or occipital adenopathy.      Upper Body:      Right upper body: No supraclavicular or axillary adenopathy.      Left upper body: No supraclavicular or axillary adenopathy.      Lower Body: No right inguinal adenopathy. No left inguinal adenopathy.   Skin:     General: Skin is warm and dry.   Neurological:      Mental Status: She is alert and oriented to person, place, and time.   Psychiatric:         Mood and Affect: Mood normal.         Behavior: Behavior normal. Behavior is cooperative.

## 2024-11-12 DIAGNOSIS — F41.9 ANXIETY: ICD-10-CM

## 2024-11-12 NOTE — TELEPHONE ENCOUNTER
1 [id number] 05/15/2024 05/15/2024 ALPRAZolam (Tablet) 180.0 90 0.25 MG NA [facility] 0 / 0 [place]    1 [id number] 11/30/2023 11/30/2023 ALPRAZolam (Tablet) 180.0 90 0.25 MG NA [facility] [employer] [facility] [employer] [facility] 0 / 0 PA

## 2024-11-13 RX ORDER — ALPRAZOLAM 0.25 MG/1
0.25 TABLET ORAL 2 TIMES DAILY PRN
Qty: 180 TABLET | Refills: 0 | Status: SHIPPED | OUTPATIENT
Start: 2024-11-13

## 2024-11-14 DIAGNOSIS — Z00.6 ENCOUNTER FOR EXAMINATION FOR NORMAL COMPARISON OR CONTROL IN CLINICAL RESEARCH PROGRAM: ICD-10-CM

## 2024-11-23 ENCOUNTER — APPOINTMENT (OUTPATIENT)
Dept: LAB | Facility: MEDICAL CENTER | Age: 64
End: 2024-11-23

## 2024-11-23 DIAGNOSIS — Z00.6 ENCOUNTER FOR EXAMINATION FOR NORMAL COMPARISON OR CONTROL IN CLINICAL RESEARCH PROGRAM: ICD-10-CM

## 2024-11-23 PROCEDURE — 36415 COLL VENOUS BLD VENIPUNCTURE: CPT

## 2024-12-08 LAB
APOB+LDLR+PCSK9 GENE MUT ANL BLD/T: NOT DETECTED
BRCA1+BRCA2 DEL+DUP + FULL MUT ANL BLD/T: NOT DETECTED
MLH1+MSH2+MSH6+PMS2 GN DEL+DUP+FUL M: NOT DETECTED

## 2025-01-13 ENCOUNTER — OFFICE VISIT (OUTPATIENT)
Dept: FAMILY MEDICINE CLINIC | Facility: CLINIC | Age: 65
End: 2025-01-13
Payer: MEDICARE

## 2025-01-13 VITALS
TEMPERATURE: 97.5 F | OXYGEN SATURATION: 97 % | WEIGHT: 129 LBS | BODY MASS INDEX: 21.49 KG/M2 | HEIGHT: 65 IN | HEART RATE: 56 BPM | DIASTOLIC BLOOD PRESSURE: 80 MMHG | SYSTOLIC BLOOD PRESSURE: 110 MMHG

## 2025-01-13 DIAGNOSIS — Z82.49 FAMILY HISTORY OF ABDOMINAL AORTIC ANEURYSM (AAA): ICD-10-CM

## 2025-01-13 DIAGNOSIS — Z00.00 MEDICARE ANNUAL WELLNESS VISIT, INITIAL: Primary | ICD-10-CM

## 2025-01-13 DIAGNOSIS — R53.83 OTHER FATIGUE: ICD-10-CM

## 2025-01-13 DIAGNOSIS — E78.00 HYPERCHOLESTEROLEMIA: ICD-10-CM

## 2025-01-13 PROCEDURE — G0403 EKG FOR INITIAL PREVENT EXAM: HCPCS | Performed by: FAMILY MEDICINE

## 2025-01-13 PROCEDURE — G0402 INITIAL PREVENTIVE EXAM: HCPCS | Performed by: FAMILY MEDICINE

## 2025-01-13 NOTE — PROGRESS NOTES
Name: Rashida Pulliam      : 1960      MRN: [id number]  Encounter Provider: Josie Abel DO  Encounter Date: 2025   Encounter department: [facility]  Medicare annual wellness visit, initial    Orders:    POCT ECG    Hypercholesterolemia  Continue with lowvat  Orders:    Lipid panel; Future    Other fatigue    Orders:    Comprehensive metabolic panel; Future    Family history of abdominal aortic aneurysm (AAA)    Orders:    US abdominal aorta screening aaa; Future       Preventive health issues were discussed with patient, and age appropriate screening tests were ordered as noted in patient's After Visit Summary. Personalized health advice and appropriate referrals for health education or preventive services given if needed, as noted in patient's After Visit Summary.    History of Present Illness     HPI   Patient Care Team:  Josie Abel DO as PCP - General  Felix Curran MD    Review of Systems   Constitutional:  Negative for appetite change, chills and fever.   HENT:  Negative for ear pain, facial swelling, rhinorrhea, sinus pain, sore throat and trouble swallowing.    Eyes:  Negative for discharge and redness.   Respiratory:  Negative for chest tightness, shortness of breath and wheezing.    Cardiovascular:  Negative for chest pain and palpitations.   Gastrointestinal:  Negative for abdominal pain, diarrhea, nausea and vomiting.   Endocrine: Negative for polyuria.   Genitourinary:  Negative for dysuria and urgency.   Musculoskeletal:  Negative for arthralgias and back pain.   Skin:  Negative for rash.   Neurological:  Negative for dizziness, weakness and headaches.   Hematological:  Negative for adenopathy.   Psychiatric/Behavioral:  Negative for behavioral problems, confusion and sleep disturbance.    All other systems reviewed and are negative.    Medical History Reviewed by provider this encounter:       Annual Wellness Visit  Questionnaire   Rashida is here for her Welcome to Medicare visit.     Health Risk Assessment:   Patient rates overall health as excellent. Patient feels that their physical health rating is same. Patient is very satisfied with their life. Eyesight was rated as same. Hearing was rated as same. Patient feels that their emotional and mental health rating is same. Patients states they are never, rarely angry. Patient states they are never, rarely unusually tired/fatigued. Pain experienced in the last 7 days has been none. Patient states that she has experienced no weight loss or gain in last 6 months.     Depression Screening:   PHQ-2 Score: 0      Fall Risk Screening:   In the past year, patient has experienced: no history of falling in past year      Urinary Incontinence Screening:   Patient has not leaked urine accidently in the last six months.     Home Safety:  Patient does not have trouble with stairs inside or outside of their home. Patient has working smoke alarms and has working carbon monoxide detector. Home safety hazards include: none.     Nutrition:   Current diet is Limited junk food.     Medications:   Patient is not currently taking any over-the-counter supplements. Patient is able to manage medications.     Activities of Daily Living (ADLs)/Instrumental Activities of Daily Living (IADLs):   Walk and transfer into and out of bed and chair?: Yes  Dress and groom yourself?: Yes    Bathe or shower yourself?: Yes    Feed yourself? Yes  Do your laundry/housekeeping?: Yes  Manage your money, pay your bills and track your expenses?: Yes  Make your own meals?: Yes    Do your own shopping?: Yes    Previous Hospitalizations:   Any hospitalizations or ED visits within the last 12 months?: No      Advance Care Planning:   Living will: Yes    Durable POA for healthcare: Yes    Advanced directive: Yes    Advanced directive counseling given: Yes    ACP document given: Yes    Patient declined ACP directive: No    End  "of Life Decisions reviewed with patient: Yes    Provider agrees with end of life decisions: Yes      Cognitive Screening:   Provider or family/friend/caregiver concerned regarding cognition?: No    PREVENTIVE SCREENINGS      Cardiovascular Screening:    General: Screening Current      Diabetes Screening:     General: Screening Current      Colorectal Cancer Screening:     General: Screening Current      Breast Cancer Screening:     General: Screening Current      Cervical Cancer Screening:    General: Screening Not Indicated      Abdominal Aortic Aneurysm (AAA) Screening:    Risk factors include: family history of AAA      Due for: Screening AAA Ultrasound      Lung Cancer Screening:     General: Screening Not Indicated    Screening, Brief Intervention, and Referral to Treatment (SBIRT)    Screening    Typical number of drinks in a week: 0    Single Item Drug Screening:  How often have you used an illegal drug (including marijuana) or a prescription medication for non-medical reasons in the past year? never    Single Item Drug Screen Score: 0  Interpretation: Negative screen for possible drug use disorder       No results found.    Objective   Ht 5' 4.5\" (1.638 m)   Wt 58.5 kg (129 lb)   LMP  (LMP Unknown)   BMI 21.80 kg/m²     Physical Exam  Vitals and nursing note reviewed.   Constitutional:       General: She is not in acute distress.     Appearance: Normal appearance. She is well-developed. She is not ill-appearing or diaphoretic.   HENT:      Head: Normocephalic and atraumatic.      Right Ear: Tympanic membrane, ear canal and external ear normal.      Left Ear: Tympanic membrane, ear canal and external ear normal.      Nose: Nose normal. No congestion or rhinorrhea.      Mouth/Throat:      Mouth: Mucous membranes are moist.      Pharynx: Oropharynx is clear. No oropharyngeal exudate or posterior oropharyngeal erythema.   Eyes:      General: No scleral icterus.        Right eye: No discharge.         Left " eye: No discharge.      Extraocular Movements: Extraocular movements intact.      Conjunctiva/sclera: Conjunctivae normal.      Pupils: Pupils are equal, round, and reactive to light.   Neck:      Thyroid: No thyromegaly.      Vascular: No carotid bruit or JVD.      Trachea: No tracheal deviation.   Cardiovascular:      Rate and Rhythm: Normal rate and regular rhythm.      Pulses: Normal pulses.      Heart sounds: Normal heart sounds. No murmur heard.  Pulmonary:      Effort: Pulmonary effort is normal. No respiratory distress.      Breath sounds: Normal breath sounds. No stridor. No wheezing, rhonchi or rales.   Abdominal:      General: Abdomen is flat. Bowel sounds are normal. There is no distension.      Palpations: Abdomen is soft. There is no mass.      Tenderness: There is no abdominal tenderness. There is no guarding or rebound.   Musculoskeletal:         General: No swelling, tenderness or deformity. Normal range of motion.      Cervical back: Normal range of motion and neck supple. No rigidity.      Right lower leg: No edema.      Left lower leg: No edema.   Lymphadenopathy:      Cervical: No cervical adenopathy.   Skin:     General: Skin is warm and dry.      Capillary Refill: Capillary refill takes less than 2 seconds.      Coloration: Skin is not jaundiced.      Findings: No bruising, erythema or rash.   Neurological:      General: No focal deficit present.      Mental Status: She is alert and oriented to person, place, and time.      Cranial Nerves: No cranial nerve deficit.      Sensory: No sensory deficit.      Motor: No abnormal muscle tone.      Coordination: Coordination normal.      Gait: Gait normal.      Deep Tendon Reflexes: Reflexes are normal and symmetric. Reflexes normal.   Psychiatric:         Mood and Affect: Mood normal.         Behavior: Behavior normal.         Thought Content: Thought content normal.         Judgment: Judgment normal.

## 2025-01-14 NOTE — PATIENT INSTRUCTIONS
Medicare Preventive Visit Patient Instructions  Thank you for completing your Welcome to Medicare Visit or Medicare Annual Wellness Visit today. Your next wellness visit will be due in one year (1/15/2026).  The screening/preventive services that you may require over the next 5-10 years are detailed below. Some tests may not apply to you based off risk factors and/or age. Screening tests ordered at today's visit but not completed yet may show as past due. Also, please note that scanned in results may not display below.  Preventive Screenings:  Service Recommendations Previous Testing/Comments   Colorectal Cancer Screening  * Colonoscopy    * Fecal Occult Blood Test (FOBT)/Fecal Immunochemical Test (FIT)  * Fecal DNA/Cologuard Test  * Flexible Sigmoidoscopy Age: 45-75 years old   Colonoscopy: every 10 years (may be performed more frequently if at higher risk)  OR  FOBT/FIT: every 1 year  OR  Cologuard: every 3 years  OR  Sigmoidoscopy: every 5 years  Screening may be recommended earlier than age 45 if at higher risk for colorectal cancer. Also, an individualized decision between you and your healthcare provider will decide whether screening between the ages of 76-85 would be appropriate. Colonoscopy: 10/05/2020  FOBT/FIT: Not on file  Cologuard: Not on file  Sigmoidoscopy: Not on file    Screening Current     Breast Cancer Screening Age: 40+ years old  Frequency: every 1-2 years  Not required if history of left and right mastectomy Mammogram: 01/15/2024    Screening Current   Cervical Cancer Screening Between the ages of 21-29, pap smear recommended once every 3 years.   Between the ages of 30-65, can perform pap smear with HPV co-testing every 5 years.   Recommendations may differ for women with a history of total hysterectomy, cervical cancer, or abnormal pap smears in past. Pap Smear: 08/31/2021    Screening Not Indicated   Hepatitis C Screening Once for adults born between 1945 and 1965  More frequently in  patients at high risk for Hepatitis C Hep C Antibody: Not on file        Diabetes Screening 1-2 times per year if you're at risk for diabetes or have pre-diabetes Fasting glucose: 78 mg/dL (12/2/2023)  A1C: 5.4 % (11/26/2022)      Cholesterol Screening Once every 5 years if you don't have a lipid disorder. May order more often based on risk factors. Lipid panel: 12/02/2023    Screening Current     Other Preventive Screenings Covered by Medicare:  Abdominal Aortic Aneurysm (AAA) Screening: covered once if your at risk. You're considered to be at risk if you have a family history of AAA.  Lung Cancer Screening: covers low dose CT scan once per year if you meet all of the following conditions: (1) Age 55-77; (2) No signs or symptoms of lung cancer; (3) Current smoker or have quit smoking within the last 15 years; (4) You have a tobacco smoking history of at least 20 pack years (packs per day multiplied by number of years you smoked); (5) You get a written order from a healthcare provider.  Glaucoma Screening: covered annually if you're considered high risk: (1) You have diabetes OR (2) Family history of glaucoma OR (3)  aged 50 and older OR (4)  American aged 65 and older  Osteoporosis Screening: covered every 2 years if you meet one of the following conditions: (1) You're estrogen deficient and at risk for osteoporosis based off medical history and other findings; (2) Have a vertebral abnormality; (3) On glucocorticoid therapy for more than 3 months; (4) Have primary hyperparathyroidism; (5) On osteoporosis medications and need to assess response to drug therapy.   Last bone density test (DXA Scan): 02/21/2024.  HIV Screening: covered annually if you're between the age of 15-65. Also covered annually if you are younger than 15 and older than 65 with risk factors for HIV infection. For pregnant patients, it is covered up to 3 times per pregnancy.    Immunizations:  Immunization Recommendations    Influenza Vaccine Annual influenza vaccination during flu season is recommended for all persons aged >= 6 months who do not have contraindications   Pneumococcal Vaccine   * Pneumococcal conjugate vaccine = PCV13 (Prevnar 13), PCV15 (Vaxneuvance), PCV20 (Prevnar 20)  * Pneumococcal polysaccharide vaccine = PPSV23 (Pneumovax) Adults 19-65 yo with certain risk factors or if 65+ yo  If never received any pneumonia vaccine: recommend Prevnar 20 (PCV20)  Give PCV20 if previously received 1 dose of PCV13 or PPSV23   Hepatitis B Vaccine 3 dose series if at intermediate or high risk (ex: diabetes, end stage renal disease, liver disease)   Respiratory syncytial virus (RSV) Vaccine - COVERED BY MEDICARE PART D  * RSVPreF3 (Arexvy) CDC recommends that adults 60 years of age and older may receive a single dose of RSV vaccine using shared clinical decision-making (SCDM)   Tetanus (Td) Vaccine - COST NOT COVERED BY MEDICARE PART B Following completion of primary series, a booster dose should be given every 10 years to maintain immunity against tetanus. Td may also be given as tetanus wound prophylaxis.   Tdap Vaccine - COST NOT COVERED BY MEDICARE PART B Recommended at least once for all adults. For pregnant patients, recommended with each pregnancy.   Shingles Vaccine (Shingrix) - COST NOT COVERED BY MEDICARE PART B  2 shot series recommended in those 19 years and older who have or will have weakened immune systems or those 50 years and older     Health Maintenance Due:      Topic Date Due   • Hepatitis C Screening  Never done   • Breast Cancer Screening: Mammogram  01/15/2025   • Colorectal Cancer Screening  10/05/2025     Immunizations Due:  There are no preventive care reminders to display for this patient.  Advance Directives   What are advance directives?  Advance directives are legal documents that state your wishes and plans for medical care. These plans are made ahead of time in case you lose your ability to make  decisions for yourself. Advance directives can apply to any medical decision, such as the treatments you want, and if you want to donate organs.   What are the types of advance directives?  There are many types of advance directives, and each state has rules about how to use them. You may choose a combination of any of the following:  Living will:  This is a written record of the treatment you want. You can also choose which treatments you do not want, which to limit, and which to stop at a certain time. This includes surgery, medicine, IV fluid, and tube feedings.   Durable power of  for healthcare (DPAHC):  This is a written record that states who you want to make healthcare choices for you when you are unable to make them for yourself. This person, called a proxy, is usually a family member or a friend. You may choose more than 1 proxy.  Do not resuscitate (DNR) order:  A DNR order is used in case your heart stops beating or you stop breathing. It is a request not to have certain forms of treatment, such as CPR. A DNR order may be included in other types of advance directives.  Medical directive:  This covers the care that you want if you are in a coma, near death, or unable to make decisions for yourself. You can list the treatments you want for each condition. Treatment may include pain medicine, surgery, blood transfusions, dialysis, IV or tube feedings, and a ventilator (breathing machine).  Values history:  This document has questions about your views, beliefs, and how you feel and think about life. This information can help others choose the care that you would choose.  Why are advance directives important?  An advance directive helps you control your care. Although spoken wishes may be used, it is better to have your wishes written down. Spoken wishes can be misunderstood, or not followed. Treatments may be given even if you do not want them. An advance directive may make it easier for your family  to make difficult choices about your care.       © Copyright [employer] 2018 Information is for End User's use only and may not be sold, redistributed or otherwise used for commercial purposes. All illustrations and images included in CareNotes® are the copyrighted property of [employer] or [employer]

## 2025-01-16 ENCOUNTER — HOSPITAL ENCOUNTER (OUTPATIENT)
Dept: RADIOLOGY | Age: 65
Discharge: HOME | End: 2025-01-16
Payer: MEDICARE

## 2025-01-16 DIAGNOSIS — Z12.31 ENCOUNTER FOR SCREENING MAMMOGRAM FOR MALIGNANT NEOPLASM OF BREAST: ICD-10-CM

## 2025-01-16 PROCEDURE — 77067 SCR MAMMO BI INCL CAD: CPT

## 2025-01-16 PROCEDURE — 77063 BREAST TOMOSYNTHESIS BI: CPT

## 2025-01-28 ENCOUNTER — RESULTS FOLLOW-UP (OUTPATIENT)
Dept: OBGYN CLINIC | Facility: CLINIC | Age: 65
End: 2025-01-28

## 2025-01-28 DIAGNOSIS — R92.8 ABNORMAL MAMMOGRAM OF RIGHT BREAST: Primary | ICD-10-CM

## 2025-02-02 DIAGNOSIS — F41.9 ANXIETY: ICD-10-CM

## 2025-02-05 ENCOUNTER — APPOINTMENT (OUTPATIENT)
Dept: LAB | Facility: CLINIC | Age: 65
End: 2025-02-05
Payer: MEDICARE

## 2025-02-05 DIAGNOSIS — R53.83 OTHER FATIGUE: ICD-10-CM

## 2025-02-05 DIAGNOSIS — E78.00 HYPERCHOLESTEROLEMIA: ICD-10-CM

## 2025-02-05 LAB
ALBUMIN SERPL BCG-MCNC: 4 G/DL (ref 3.5–5)
ALP SERPL-CCNC: 44 U/L (ref 34–104)
ALT SERPL W P-5'-P-CCNC: 19 U/L (ref 7–52)
ANION GAP SERPL CALCULATED.3IONS-SCNC: 9 MMOL/L (ref 4–13)
AST SERPL W P-5'-P-CCNC: 25 U/L (ref 13–39)
BILIRUB SERPL-MCNC: 0.48 MG/DL (ref 0.2–1)
BUN SERPL-MCNC: 20 MG/DL (ref 5–25)
CALCIUM SERPL-MCNC: 8.9 MG/DL (ref 8.4–10.2)
CHLORIDE SERPL-SCNC: 107 MMOL/L (ref 96–108)
CHOLEST SERPL-MCNC: 191 MG/DL (ref ?–200)
CO2 SERPL-SCNC: 27 MMOL/L (ref 21–32)
CREAT SERPL-MCNC: 1.02 MG/DL (ref 0.6–1.3)
GFR SERPL CREATININE-BSD FRML MDRD: 57 ML/MIN/1.73SQ M
GLUCOSE P FAST SERPL-MCNC: 91 MG/DL (ref 65–99)
HDLC SERPL-MCNC: 82 MG/DL
LDLC SERPL CALC-MCNC: 97 MG/DL (ref 0–100)
NONHDLC SERPL-MCNC: 109 MG/DL
POTASSIUM SERPL-SCNC: 4.4 MMOL/L (ref 3.5–5.3)
PROT SERPL-MCNC: 6.3 G/DL (ref 6.4–8.4)
SODIUM SERPL-SCNC: 143 MMOL/L (ref 135–147)
TRIGL SERPL-MCNC: 62 MG/DL (ref ?–150)

## 2025-02-05 PROCEDURE — 80061 LIPID PANEL: CPT

## 2025-02-05 PROCEDURE — 36415 COLL VENOUS BLD VENIPUNCTURE: CPT

## 2025-02-05 PROCEDURE — 80053 COMPREHEN METABOLIC PANEL: CPT

## 2025-02-06 ENCOUNTER — RESULTS FOLLOW-UP (OUTPATIENT)
Dept: FAMILY MEDICINE CLINIC | Facility: CLINIC | Age: 65
End: 2025-02-06

## 2025-02-18 ENCOUNTER — HOSPITAL ENCOUNTER (OUTPATIENT)
Dept: RADIOLOGY | Facility: MEDICAL CENTER | Age: 65
Discharge: HOME | End: 2025-02-18
Payer: MEDICARE

## 2025-02-18 DIAGNOSIS — Z82.49 FAMILY HISTORY OF ABDOMINAL AORTIC ANEURYSM (AAA): ICD-10-CM

## 2025-02-18 PROCEDURE — 76706 US ABDL AORTA SCREEN AAA: CPT

## 2025-02-26 ENCOUNTER — HOSPITAL ENCOUNTER (OUTPATIENT)
Dept: MAMMOGRAPHY | Facility: CLINIC | Age: 65
Discharge: HOME | End: 2025-02-26
Payer: MEDICARE

## 2025-02-26 ENCOUNTER — HOSPITAL ENCOUNTER (OUTPATIENT)
Dept: ULTRASOUND IMAGING | Facility: CLINIC | Age: 65
Discharge: HOME | End: 2025-02-26
Payer: MEDICARE

## 2025-02-26 ENCOUNTER — RESULTS FOLLOW-UP (OUTPATIENT)
Dept: OBGYN CLINIC | Facility: CLINIC | Age: 65
End: 2025-02-26

## 2025-02-26 DIAGNOSIS — R92.8 ABNORMAL MAMMOGRAM OF RIGHT BREAST: ICD-10-CM

## 2025-02-26 PROCEDURE — G0279 TOMOSYNTHESIS, MAMMO: HCPCS

## 2025-02-26 PROCEDURE — 77065 DX MAMMO INCL CAD UNI: CPT

## 2025-05-16 DIAGNOSIS — F41.9 ANXIETY: ICD-10-CM

## 2025-05-16 RX ORDER — ALPRAZOLAM 0.25 MG
0.25 TABLET ORAL 2 TIMES DAILY PRN
Qty: 180 TABLET | Refills: 0 | Status: SHIPPED | OUTPATIENT
Start: 2025-05-16

## 2025-05-16 NOTE — TELEPHONE ENCOUNTER
Reason for call:   [x] Refill   [] Prior Auth  [] Other:     Office:   [x] PCP/Provider - LUL DIAZ  Authorized By: Josie Abel DO    [] Specialty/Provider -     Medication: ALPRAZolam (XANAX) 0.25 mg tablet    Dose/Frequency: Take 1 tablet (0.25 mg total) by mouth 2 (two) times a day as needed for anxiety    Quantity: 180    Pharmacy: [id number] - [place]     Local Pharmacy   Does the patient have enough for 3 days?   [x] Yes   [] No - Send as HP to POD    Mail Away Pharmacy   Does the patient have enough for 10 days?   [] Yes   [] No - Send as HP to POD

## 2025-05-16 NOTE — TELEPHONE ENCOUNTER
1 [id number] ** 11/13/2024 11/13/2024 ALPRAZolam (Tablet) 180.0 90 0.25 MG NA [facility] [place], [facility] Commercial Insurance 0 / 0 [place]